# Patient Record
Sex: MALE | Race: WHITE | Employment: OTHER | URBAN - METROPOLITAN AREA
[De-identification: names, ages, dates, MRNs, and addresses within clinical notes are randomized per-mention and may not be internally consistent; named-entity substitution may affect disease eponyms.]

---

## 2020-05-18 ENCOUNTER — HOSPITAL ENCOUNTER (EMERGENCY)
Age: 85
Discharge: HOME | End: 2020-05-18
Attending: EMERGENCY MEDICINE
Payer: MEDICARE

## 2020-05-18 ENCOUNTER — APPOINTMENT (OUTPATIENT)
Dept: GENERAL RADIOLOGY | Age: 85
End: 2020-05-18
Payer: MEDICARE

## 2020-05-18 VITALS
RESPIRATION RATE: 18 BRPM | BODY MASS INDEX: 26.18 KG/M2 | OXYGEN SATURATION: 98 % | HEIGHT: 76 IN | DIASTOLIC BLOOD PRESSURE: 88 MMHG | HEART RATE: 65 BPM | WEIGHT: 215 LBS | SYSTOLIC BLOOD PRESSURE: 200 MMHG | TEMPERATURE: 97.3 F

## 2020-05-18 LAB
A/G RATIO: 1 (ref 1.1–2.2)
ALBUMIN SERPL-MCNC: 2.9 G/DL (ref 3.4–5)
ALP BLD-CCNC: 77 U/L (ref 40–129)
ALT SERPL-CCNC: 7 U/L (ref 10–40)
ANION GAP SERPL CALCULATED.3IONS-SCNC: 9 MMOL/L (ref 3–16)
AST SERPL-CCNC: 15 U/L (ref 15–37)
BASOPHILS ABSOLUTE: 0.1 K/UL (ref 0–0.2)
BASOPHILS RELATIVE PERCENT: 0.9 %
BILIRUB SERPL-MCNC: 0.3 MG/DL (ref 0–1)
BUN BLDV-MCNC: 49 MG/DL (ref 7–20)
CALCIUM SERPL-MCNC: 8.6 MG/DL (ref 8.3–10.6)
CHLORIDE BLD-SCNC: 109 MMOL/L (ref 99–110)
CO2: 24 MMOL/L (ref 21–32)
CREAT SERPL-MCNC: 3.5 MG/DL (ref 0.8–1.3)
EKG ATRIAL RATE: 70 BPM
EKG DIAGNOSIS: NORMAL
EKG P AXIS: 7 DEGREES
EKG P-R INTERVAL: 204 MS
EKG Q-T INTERVAL: 430 MS
EKG QRS DURATION: 96 MS
EKG QTC CALCULATION (BAZETT): 464 MS
EKG R AXIS: -21 DEGREES
EKG T AXIS: 129 DEGREES
EKG VENTRICULAR RATE: 70 BPM
EOSINOPHILS ABSOLUTE: 0.1 K/UL (ref 0–0.6)
EOSINOPHILS RELATIVE PERCENT: 1 %
GFR AFRICAN AMERICAN: 20
GFR NON-AFRICAN AMERICAN: 16
GLOBULIN: 2.9 G/DL
GLUCOSE BLD-MCNC: 133 MG/DL (ref 70–99)
GLUCOSE BLD-MCNC: 166 MG/DL (ref 70–99)
GLUCOSE BLD-MCNC: 175 MG/DL (ref 70–99)
HCT VFR BLD CALC: 36.5 % (ref 40.5–52.5)
HEMOGLOBIN: 12.1 G/DL (ref 13.5–17.5)
LYMPHOCYTES ABSOLUTE: 2.2 K/UL (ref 1–5.1)
LYMPHOCYTES RELATIVE PERCENT: 25.7 %
MCH RBC QN AUTO: 27 PG (ref 26–34)
MCHC RBC AUTO-ENTMCNC: 33.2 G/DL (ref 31–36)
MCV RBC AUTO: 81.3 FL (ref 80–100)
MONOCYTES ABSOLUTE: 1 K/UL (ref 0–1.3)
MONOCYTES RELATIVE PERCENT: 11.4 %
NEUTROPHILS ABSOLUTE: 5.2 K/UL (ref 1.7–7.7)
NEUTROPHILS RELATIVE PERCENT: 61 %
PDW BLD-RTO: 18.8 % (ref 12.4–15.4)
PERFORMED ON: ABNORMAL
PERFORMED ON: ABNORMAL
PLATELET # BLD: 143 K/UL (ref 135–450)
PMV BLD AUTO: 9.4 FL (ref 5–10.5)
POTASSIUM SERPL-SCNC: 4.1 MMOL/L (ref 3.5–5.1)
RBC # BLD: 4.49 M/UL (ref 4.2–5.9)
SODIUM BLD-SCNC: 142 MMOL/L (ref 136–145)
TOTAL PROTEIN: 5.8 G/DL (ref 6.4–8.2)
TROPONIN: 0.04 NG/ML
TROPONIN: 0.04 NG/ML
WBC # BLD: 8.5 K/UL (ref 4–11)

## 2020-05-18 PROCEDURE — 99285 EMERGENCY DEPT VISIT HI MDM: CPT

## 2020-05-18 PROCEDURE — 71045 X-RAY EXAM CHEST 1 VIEW: CPT

## 2020-05-18 PROCEDURE — 84484 ASSAY OF TROPONIN QUANT: CPT

## 2020-05-18 PROCEDURE — 93010 ELECTROCARDIOGRAM REPORT: CPT | Performed by: INTERNAL MEDICINE

## 2020-05-18 PROCEDURE — 80053 COMPREHEN METABOLIC PANEL: CPT

## 2020-05-18 PROCEDURE — 85025 COMPLETE CBC W/AUTO DIFF WBC: CPT

## 2020-05-18 PROCEDURE — 6370000000 HC RX 637 (ALT 250 FOR IP): Performed by: EMERGENCY MEDICINE

## 2020-05-18 PROCEDURE — 93005 ELECTROCARDIOGRAM TRACING: CPT | Performed by: EMERGENCY MEDICINE

## 2020-05-18 RX ORDER — LISINOPRIL 5 MG/1
5 TABLET ORAL ONCE
Status: COMPLETED | OUTPATIENT
Start: 2020-05-18 | End: 2020-05-18

## 2020-05-18 RX ADMIN — LISINOPRIL 5 MG: 5 TABLET ORAL at 12:28

## 2020-05-18 NOTE — ED NOTES
Bed: 04  Expected date: 5/18/20  Expected time: 9:18 AM  Means of arrival: [facility]  Comments:  [facility] [address] [facility], [facility]  05/18/20 0619

## 2020-05-18 NOTE — ED PROVIDER NOTES
OP)    Apply 1 drop to eye 2 times daily. Both eyes     GLYBURIDE (DIABETA) 5 MG TABLET    Take 10 mg by mouth 2 times daily. LISINOPRIL (PRINIVIL;ZESTRIL) 2.5 MG TABLET    Take 2.5 mg by mouth daily. LOVASTATIN (MEVACOR) 20 MG TABLET    Take 20 mg by mouth daily. ONDANSETRON (ZOFRAN) 4 MG TABLET    Take 1 tablet by mouth every 8 hours as needed for Nausea    PREDNISOLONE ACETATE (PRED FORTE) 1 % OPHTHALMIC SUSPENSION    Place 1 drop into the left eye 2 times daily. Social history:  reports that he quit smoking about 29 years ago. He does not have any smokeless tobacco history on file. He reports that he does not drink alcohol or use drugs. Family history:  History reviewed. No pertinent family history. Exam  ED Triage Vitals [05/18/20 0929]   BP Temp Temp Source Pulse Resp SpO2 Height Weight   (!) 202/99 97.5 °F (36.4 °C) Oral 71 17 99 % 6' 4\" (1.93 m) 215 lb (97.5 kg)     Nursing note and vitals reviewed. Constitutional: In no acute distress  HENT:      Head: Normocephalic      Ears: External ears normal.      Nose: Nose normal.     Mouth: Membrane mucosa moist   Eyes: No discharge. Neck: Supple. Trachea midline. Cardiovascular: Regular rate. Warm extremities  Pulmonary/Chest: Effort normal. No respiratory distress. Speaking in full sentences. No wheezes   Abdominal: Soft. No distension. Nontender  : Deferred  Rectal: Deferred   Musculoskeletal: Moves all extremities. No gross deformity. Neurological: Alert and oriented. Face symmetric. Speech is clear. Skin: Warm and dry. No rash. Psychiatric: Normal mood and affect.  Behavior is normal.    Procedures            Radiology  No orders to display       Labs  Results for orders placed or performed during the hospital encounter of 05/18/20   CBC Auto Differential   Result Value Ref Range    WBC 8.5 4.0 - 11.0 K/uL    RBC 4.49 4.20 - 5.90 M/uL    Hemoglobin 12.1 (L) 13.5 - 17.5 g/dL    Hematocrit 36.5 (L) 40.5 - 52.5 %    MCV 81.3 80.0 - 100.0 fL    MCH 27.0 26.0 - 34.0 pg    MCHC 33.2 31.0 - 36.0 g/dL    RDW 18.8 (H) 12.4 - 15.4 %    Platelets 358 272 - 212 K/uL    MPV 9.4 5.0 - 10.5 fL    Neutrophils % 61.0 %    Lymphocytes % 25.7 %    Monocytes % 11.4 %    Eosinophils % 1.0 %    Basophils % 0.9 %    Neutrophils Absolute 5.2 1.7 - 7.7 K/uL    Lymphocytes Absolute 2.2 1.0 - 5.1 K/uL    Monocytes Absolute 1.0 0.0 - 1.3 K/uL    Eosinophils Absolute 0.1 0.0 - 0.6 K/uL    Basophils Absolute 0.1 0.0 - 0.2 K/uL   POCT Glucose   Result Value Ref Range    POC Glucose 133 (H) 70 - 99 mg/dl    Performed on ACCU-CHEK        Screenings           MDM and ED Course  Patient is a 42-year-old man with past medical history of diabetes who presents with episode of hypoglycemia. Improved with glucose given en route by EMS. Now asymptomatic. Point-of-care glucose recheck here shows mild hyperglycemia now. Will give juice to the patient and observe him. (EMP MDM)    Last creatinine at [facility] 3.29, today 3.5. Troponin elevated 0.04 likely 2/2 ckd. Not having any chest pain or shortness of breath. Will repeat. Repeat stable. EKG reveals normal sinus rhythm with no ST changes. There is T wave flattening V5 and V6 however only comparison is from 8 years ago. CXR shows possible atelectasis vs pneumonia. Not having any infectious symptoms. No leukocytosis. Patient was reassessed. They are feeling well. Asymptomatic. Hypertensive, but missed meds. Objectively they appear to be in no acute distress. Discharge instructions, follow up instructions, and return precautions were discussed with the patient and any available family. All questions were answered. [unfilled]    Final Impression  1. Hypoglycemia        Blood pressure (!) 202/99, pulse 71, temperature 97.5 °F (36.4 °C), temperature source Oral, resp. rate 17, height 6' 4\" (1.93 m), weight 215 lb (97.5 kg), SpO2 99 %.      Disposition:  DISPOSITION        Patient Referrals:  No

## 2020-05-18 NOTE — ED NOTES
Pt given orange juice, pudding, and bee crackers. Pt is currently eating and drinking with no issues.       Simone Dakin, RN  05/18/20 6936

## 2020-05-18 NOTE — ED NOTES
Contact patient's son about a ride. He is bringing pants for the patient to put on. Son is about 15 minutes out.       Simone Dakin, RN  05/18/20 9032

## 2020-10-06 ENCOUNTER — APPOINTMENT (OUTPATIENT)
Dept: VASCULAR LAB | Age: 85
DRG: 812 | End: 2020-10-06
Payer: MEDICARE

## 2020-10-06 ENCOUNTER — HOSPITAL ENCOUNTER (INPATIENT)
Age: 85
LOS: 1 days | Discharge: HOME HEALTH | DRG: 812 | End: 2020-10-09
Attending: EMERGENCY MEDICINE | Admitting: INTERNAL MEDICINE
Payer: MEDICARE

## 2020-10-06 ENCOUNTER — APPOINTMENT (OUTPATIENT)
Dept: CT IMAGING | Age: 85
DRG: 812 | End: 2020-10-06
Payer: MEDICARE

## 2020-10-06 ENCOUNTER — APPOINTMENT (OUTPATIENT)
Dept: GENERAL RADIOLOGY | Age: 85
DRG: 812 | End: 2020-10-06
Payer: MEDICARE

## 2020-10-06 LAB
A/G RATIO: 1.1 (ref 1.1–2.2)
ALBUMIN SERPL-MCNC: 3.2 G/DL (ref 3.4–5)
ALP BLD-CCNC: 74 U/L (ref 40–129)
ALT SERPL-CCNC: 8 U/L (ref 10–40)
ANION GAP SERPL CALCULATED.3IONS-SCNC: 16 MMOL/L (ref 3–16)
AST SERPL-CCNC: 15 U/L (ref 15–37)
BACTERIA: ABNORMAL /HPF
BASOPHILS ABSOLUTE: 0.1 K/UL (ref 0–0.2)
BASOPHILS ABSOLUTE: 0.1 K/UL (ref 0–0.2)
BASOPHILS RELATIVE PERCENT: 0.6 %
BASOPHILS RELATIVE PERCENT: 1.4 %
BILIRUB SERPL-MCNC: 0.3 MG/DL (ref 0–1)
BILIRUBIN URINE: NEGATIVE
BLOOD, URINE: NEGATIVE
BUN BLDV-MCNC: 73 MG/DL (ref 7–20)
CALCIUM SERPL-MCNC: 8.6 MG/DL (ref 8.3–10.6)
CHLORIDE BLD-SCNC: 99 MMOL/L (ref 99–110)
CLARITY: CLEAR
CO2: 20 MMOL/L (ref 21–32)
COLOR: ABNORMAL
CREAT SERPL-MCNC: 3.6 MG/DL (ref 0.8–1.3)
EKG ATRIAL RATE: 65 BPM
EKG DIAGNOSIS: NORMAL
EKG P AXIS: 65 DEGREES
EKG P-R INTERVAL: 248 MS
EKG Q-T INTERVAL: 446 MS
EKG QRS DURATION: 98 MS
EKG QTC CALCULATION (BAZETT): 463 MS
EKG R AXIS: -47 DEGREES
EKG T AXIS: 76 DEGREES
EKG VENTRICULAR RATE: 65 BPM
EOSINOPHILS ABSOLUTE: 0.1 K/UL (ref 0–0.6)
EOSINOPHILS ABSOLUTE: 0.2 K/UL (ref 0–0.6)
EOSINOPHILS RELATIVE PERCENT: 0.7 %
EOSINOPHILS RELATIVE PERCENT: 1.6 %
GFR AFRICAN AMERICAN: 19
GFR NON-AFRICAN AMERICAN: 16
GLOBULIN: 3 G/DL
GLUCOSE BLD-MCNC: 176 MG/DL (ref 70–99)
GLUCOSE URINE: 100 MG/DL
HCT VFR BLD CALC: 28.3 % (ref 40.5–52.5)
HCT VFR BLD CALC: 29.4 % (ref 40.5–52.5)
HEMOGLOBIN: 9.5 G/DL (ref 13.5–17.5)
HEMOGLOBIN: 9.9 G/DL (ref 13.5–17.5)
KETONES, URINE: NEGATIVE MG/DL
LEUKOCYTE ESTERASE, URINE: ABNORMAL
LYMPHOCYTES ABSOLUTE: 2.2 K/UL (ref 1–5.1)
LYMPHOCYTES ABSOLUTE: 2.8 K/UL (ref 1–5.1)
LYMPHOCYTES RELATIVE PERCENT: 21.9 %
LYMPHOCYTES RELATIVE PERCENT: 23.9 %
MCH RBC QN AUTO: 29.1 PG (ref 26–34)
MCH RBC QN AUTO: 29.1 PG (ref 26–34)
MCHC RBC AUTO-ENTMCNC: 33.5 G/DL (ref 31–36)
MCHC RBC AUTO-ENTMCNC: 33.8 G/DL (ref 31–36)
MCV RBC AUTO: 86.2 FL (ref 80–100)
MCV RBC AUTO: 86.9 FL (ref 80–100)
MICROSCOPIC EXAMINATION: YES
MONOCYTES ABSOLUTE: 1 K/UL (ref 0–1.3)
MONOCYTES ABSOLUTE: 1.1 K/UL (ref 0–1.3)
MONOCYTES RELATIVE PERCENT: 9.1 %
MONOCYTES RELATIVE PERCENT: 9.4 %
NEUTROPHILS ABSOLUTE: 6.8 K/UL (ref 1.7–7.7)
NEUTROPHILS ABSOLUTE: 7.5 K/UL (ref 1.7–7.7)
NEUTROPHILS RELATIVE PERCENT: 64.8 %
NEUTROPHILS RELATIVE PERCENT: 66.6 %
NITRITE, URINE: NEGATIVE
PDW BLD-RTO: 15.5 % (ref 12.4–15.4)
PDW BLD-RTO: 15.8 % (ref 12.4–15.4)
PH UA: 6 (ref 5–8)
PLATELET # BLD: 138 K/UL (ref 135–450)
PLATELET # BLD: 180 K/UL (ref 135–450)
PMV BLD AUTO: 8.3 FL (ref 5–10.5)
PMV BLD AUTO: 8.7 FL (ref 5–10.5)
POTASSIUM SERPL-SCNC: 4.5 MMOL/L (ref 3.5–5.1)
PROTEIN UA: 100 MG/DL
RBC # BLD: 3.25 M/UL (ref 4.2–5.9)
RBC # BLD: 3.41 M/UL (ref 4.2–5.9)
RBC UA: ABNORMAL /HPF (ref 0–4)
SARS-COV-2, NAAT: NOT DETECTED
SODIUM BLD-SCNC: 135 MMOL/L (ref 136–145)
SPECIFIC GRAVITY UA: 1.02 (ref 1–1.03)
TOTAL PROTEIN: 6.2 G/DL (ref 6.4–8.2)
TROPONIN: 0.03 NG/ML
URINE TYPE: ABNORMAL
UROBILINOGEN, URINE: 0.2 E.U./DL
WBC # BLD: 10.3 K/UL (ref 4–11)
WBC # BLD: 11.6 K/UL (ref 4–11)
WBC UA: ABNORMAL /HPF (ref 0–5)

## 2020-10-06 PROCEDURE — 93005 ELECTROCARDIOGRAM TRACING: CPT | Performed by: NURSE PRACTITIONER

## 2020-10-06 PROCEDURE — G0378 HOSPITAL OBSERVATION PER HR: HCPCS

## 2020-10-06 PROCEDURE — 84484 ASSAY OF TROPONIN QUANT: CPT

## 2020-10-06 PROCEDURE — 93971 EXTREMITY STUDY: CPT

## 2020-10-06 PROCEDURE — 71045 X-RAY EXAM CHEST 1 VIEW: CPT

## 2020-10-06 PROCEDURE — 85025 COMPLETE CBC W/AUTO DIFF WBC: CPT

## 2020-10-06 PROCEDURE — 73502 X-RAY EXAM HIP UNI 2-3 VIEWS: CPT

## 2020-10-06 PROCEDURE — 72125 CT NECK SPINE W/O DYE: CPT

## 2020-10-06 PROCEDURE — U0002 COVID-19 LAB TEST NON-CDC: HCPCS

## 2020-10-06 PROCEDURE — 99285 EMERGENCY DEPT VISIT HI MDM: CPT

## 2020-10-06 PROCEDURE — 87077 CULTURE AEROBIC IDENTIFY: CPT

## 2020-10-06 PROCEDURE — 90715 TDAP VACCINE 7 YRS/> IM: CPT | Performed by: NURSE PRACTITIONER

## 2020-10-06 PROCEDURE — 73700 CT LOWER EXTREMITY W/O DYE: CPT

## 2020-10-06 PROCEDURE — 87186 SC STD MICRODIL/AGAR DIL: CPT

## 2020-10-06 PROCEDURE — 81001 URINALYSIS AUTO W/SCOPE: CPT

## 2020-10-06 PROCEDURE — 90471 IMMUNIZATION ADMIN: CPT | Performed by: NURSE PRACTITIONER

## 2020-10-06 PROCEDURE — 80053 COMPREHEN METABOLIC PANEL: CPT

## 2020-10-06 PROCEDURE — 2580000003 HC RX 258: Performed by: NURSE PRACTITIONER

## 2020-10-06 PROCEDURE — 96365 THER/PROPH/DIAG IV INF INIT: CPT

## 2020-10-06 PROCEDURE — 93005 ELECTROCARDIOGRAM TRACING: CPT | Performed by: EMERGENCY MEDICINE

## 2020-10-06 PROCEDURE — 87086 URINE CULTURE/COLONY COUNT: CPT

## 2020-10-06 PROCEDURE — 6360000002 HC RX W HCPCS: Performed by: NURSE PRACTITIONER

## 2020-10-06 PROCEDURE — 70450 CT HEAD/BRAIN W/O DYE: CPT

## 2020-10-06 PROCEDURE — 93010 ELECTROCARDIOGRAM REPORT: CPT | Performed by: INTERNAL MEDICINE

## 2020-10-06 RX ORDER — SODIUM CHLORIDE 9 MG/ML
INJECTION, SOLUTION INTRAVENOUS CONTINUOUS
Status: DISCONTINUED | OUTPATIENT
Start: 2020-10-07 | End: 2020-10-07

## 2020-10-06 RX ORDER — ATORVASTATIN CALCIUM 10 MG/1
10 TABLET, FILM COATED ORAL DAILY
Status: DISCONTINUED | OUTPATIENT
Start: 2020-10-07 | End: 2020-10-09 | Stop reason: HOSPADM

## 2020-10-06 RX ORDER — ACETAMINOPHEN 325 MG/1
650 TABLET ORAL EVERY 6 HOURS PRN
Status: DISCONTINUED | OUTPATIENT
Start: 2020-10-06 | End: 2020-10-09 | Stop reason: HOSPADM

## 2020-10-06 RX ORDER — ACETAMINOPHEN 650 MG/1
650 SUPPOSITORY RECTAL EVERY 6 HOURS PRN
Status: DISCONTINUED | OUTPATIENT
Start: 2020-10-06 | End: 2020-10-09 | Stop reason: HOSPADM

## 2020-10-06 RX ORDER — DEXTROSE MONOHYDRATE 50 MG/ML
100 INJECTION, SOLUTION INTRAVENOUS PRN
Status: DISCONTINUED | OUTPATIENT
Start: 2020-10-06 | End: 2020-10-09 | Stop reason: HOSPADM

## 2020-10-06 RX ORDER — POLYETHYLENE GLYCOL 3350 17 G/17G
17 POWDER, FOR SOLUTION ORAL DAILY PRN
Status: DISCONTINUED | OUTPATIENT
Start: 2020-10-06 | End: 2020-10-09 | Stop reason: HOSPADM

## 2020-10-06 RX ORDER — PROMETHAZINE HYDROCHLORIDE 25 MG/1
12.5 TABLET ORAL EVERY 6 HOURS PRN
Status: DISCONTINUED | OUTPATIENT
Start: 2020-10-06 | End: 2020-10-09 | Stop reason: HOSPADM

## 2020-10-06 RX ORDER — DEXTROSE MONOHYDRATE 25 G/50ML
12.5 INJECTION, SOLUTION INTRAVENOUS PRN
Status: DISCONTINUED | OUTPATIENT
Start: 2020-10-06 | End: 2020-10-09 | Stop reason: HOSPADM

## 2020-10-06 RX ORDER — ASPIRIN 81 MG/1
81 TABLET ORAL DAILY
Status: DISCONTINUED | OUTPATIENT
Start: 2020-10-07 | End: 2020-10-09 | Stop reason: HOSPADM

## 2020-10-06 RX ORDER — 0.9 % SODIUM CHLORIDE 0.9 %
1000 INTRAVENOUS SOLUTION INTRAVENOUS ONCE
Status: COMPLETED | OUTPATIENT
Start: 2020-10-06 | End: 2020-10-06

## 2020-10-06 RX ORDER — ONDANSETRON 2 MG/ML
4 INJECTION INTRAMUSCULAR; INTRAVENOUS EVERY 6 HOURS PRN
Status: DISCONTINUED | OUTPATIENT
Start: 2020-10-06 | End: 2020-10-09 | Stop reason: HOSPADM

## 2020-10-06 RX ORDER — SODIUM CHLORIDE 0.9 % (FLUSH) 0.9 %
10 SYRINGE (ML) INJECTION EVERY 12 HOURS SCHEDULED
Status: DISCONTINUED | OUTPATIENT
Start: 2020-10-07 | End: 2020-10-09 | Stop reason: HOSPADM

## 2020-10-06 RX ORDER — LISINOPRIL 2.5 MG/1
2.5 TABLET ORAL DAILY
Status: DISCONTINUED | OUTPATIENT
Start: 2020-10-07 | End: 2020-10-07

## 2020-10-06 RX ORDER — SODIUM CHLORIDE 0.9 % (FLUSH) 0.9 %
10 SYRINGE (ML) INJECTION PRN
Status: DISCONTINUED | OUTPATIENT
Start: 2020-10-06 | End: 2020-10-09 | Stop reason: HOSPADM

## 2020-10-06 RX ORDER — TRAMADOL HYDROCHLORIDE 50 MG/1
50 TABLET ORAL EVERY 6 HOURS PRN
Status: DISCONTINUED | OUTPATIENT
Start: 2020-10-06 | End: 2020-10-09 | Stop reason: HOSPADM

## 2020-10-06 RX ORDER — ATENOLOL 25 MG/1
25 TABLET ORAL DAILY
Status: DISCONTINUED | OUTPATIENT
Start: 2020-10-07 | End: 2020-10-09 | Stop reason: HOSPADM

## 2020-10-06 RX ORDER — NICOTINE POLACRILEX 4 MG
15 LOZENGE BUCCAL PRN
Status: DISCONTINUED | OUTPATIENT
Start: 2020-10-06 | End: 2020-10-09 | Stop reason: HOSPADM

## 2020-10-06 RX ADMIN — SODIUM CHLORIDE 1000 ML: 9 INJECTION, SOLUTION INTRAVENOUS at 20:07

## 2020-10-06 RX ADMIN — CEFTRIAXONE SODIUM 1 G: 1 INJECTION, POWDER, FOR SOLUTION INTRAMUSCULAR; INTRAVENOUS at 23:22

## 2020-10-06 RX ADMIN — TETANUS TOXOID, REDUCED DIPHTHERIA TOXOID AND ACELLULAR PERTUSSIS VACCINE, ADSORBED 0.5 ML: 5; 2.5; 8; 8; 2.5 SUSPENSION INTRAMUSCULAR at 20:07

## 2020-10-06 ASSESSMENT — PAIN SCALES - GENERAL: PAINLEVEL_OUTOF10: 10

## 2020-10-06 NOTE — ED NOTES
During orthostatic blood pressure test, pt became hypotensive and nearly had a syncopal episode during the standing trial. Stood pt with the assistance of a walker and was able to guide pt back into the bed without pt falling. Pt looked faint at this point before pt was guided back into the bed. For these reasons, my clinical judgment determined that it was unsafe to walk this pt and the risk of this pt falling is high. Pt's son also stated that \"the pt's doctor changed his medication roughly a month ago\". Relayed all this information to Rafal Mcnair  10/06/20 1953

## 2020-10-06 NOTE — ED PROVIDER NOTES
temperature source Oral, resp. rate 18, weight 174 lb (78.9 kg), SpO2 100 %. For further details of [facility] emergency department encounter, please see documentation by advanced practice provider, Bill Dial.        Neville Adan MD  10/06/20 8322

## 2020-10-06 NOTE — ED NOTES
Bed: 30  Expected date:   Expected time:   Means of arrival:   Comments:  Lam Carrillo, RN  10/06/20 1500

## 2020-10-06 NOTE — ED PROVIDER NOTES
[facility] Emergency Department    CHIEF COMPLAINT  Hip Pain (left hip pain  pulling jeans on) and Loss of Consciousness ( injured left elbow)      HISTORY OF PRESENT ILLNESS  Keeley Clay is a 80 y.o. male who presents to the ED complaining of left hip pain. Patient reports that he was putting on his jeans and suddenly experienced severe left hip pain. Patient's family reports he then \"passed out. \"  Syncopal episode was witnessed. No reported head injury. Abrasion to left forearm from fall. No other reported injury or trauma. Patient is in emergency department stretcher and reports he has no left hip pain if he has at rest, but if he tries to move the left lower extremity or get up he has severe pain. Patient denies history of hip fracture or hip replacement. Patient denies numbness or tingling of the left lower extremity. Patient unsure of last tetanus vaccination. Patient denies recent illness, cough, emesis, diarrhea, urinary complaints. Patient does self catheterize himself 3 times a day. No other complaints, modifying factors or associated symptoms. Nursing notes reviewed. Past Medical History:   Diagnosis Date    Aortic aneurysm ([address]     aortic stent in place    Arthritis     Cancer ([address]     skin    Diabetes mellitus ([address]     Hyperlipidemia     Hypertension     Urinary problem     self cath tid    Wears glasses      Past Surgical History:   Procedure Laterality Date    CATARACT REMOVAL WITH IMPLANT  3/2012    LUNG BIOPSY      negative    OTHER SURGICAL HISTORY      aortic stent for aneurysm    TURP       History reviewed. No pertinent family history. Social History     Socioeconomic History    Marital status:       Spouse name: Not on file    Number of children: Not on file    Years of education: Not on file    Highest education level: Not on file   Occupational History    Not on file   Social Needs    Financial resource strain: Not on file    Food insecurity     Worry: Not on file     Inability: Not on file    Transportation needs     Medical: Not on file     Non-medical: Not on file   Tobacco Use    Smoking status: Former Smoker     Last attempt to quit: 3/28/1991     Years since quittin.5    Smokeless tobacco: Never Used   Substance and Sexual Activity    Alcohol use: No    Drug use: No    Sexual activity: Not on file   Lifestyle    Physical activity     Days per week: Not on file     Minutes per session: Not on file    Stress: Not on file   Relationships    Social connections     Talks on phone: Not on file     Gets together: Not on file     Attends [employer] service: Not on file     Active member of club or organization: Not on file     Attends meetings of clubs or organizations: Not on file     Relationship status: Not on file    Intimate partner violence     Fear of current or ex partner: Not on file     Emotionally abused: Not on file     Physically abused: Not on file     Forced sexual activity: Not on file   Other Topics Concern    Not on file   Social History Narrative    Not on file     Current Facility-Administered Medications   Medication Dose Route Frequency Provider Last Rate Last Dose    Tetanus-Diphth-Acell Pertussis (BOOSTRIX) injection 0.5 mL  0.5 mL Intramuscular Once BRIAN Marquez - CNP         Current Outpatient Medications   Medication Sig Dispense Refill    ondansetron (ZOFRAN) 4 MG tablet Take 1 tablet by mouth every 8 hours as needed for Nausea 10 tablet 0    atenolol (TENORMIN) 25 MG tablet Take 25 mg by mouth daily      glyBURIDE (DIABETA) 5 MG tablet Take 10 mg by mouth 2 times daily.  lisinopril (PRINIVIL;ZESTRIL) 2.5 MG tablet Take 2.5 mg by mouth daily.  lovastatin (MEVACOR) 20 MG tablet Take 20 mg by mouth daily.  Dorzolamide HCl-Timolol Mal (COSOPT OP) Apply 1 drop to eye 2 times daily.  Both eyes       prednisoLONE acetate (PRED FORTE) 1 % ophthalmic suspension Place 1 drop into the left eye 2 times daily.  aspirin 81 MG EC tablet Take 81 mg by mouth daily.  atropine 1 % ophthalmic solution Place 1 drop into the left eye 2 times daily. No Known Allergies    REVIEW OF SYSTEMS  10 systems reviewed, pertinent positives per HPI otherwise noted to be negative    PHYSICAL EXAM  /63   Pulse 69   Temp 97.6 °F (36.4 °C) (Oral)   Resp 18   Wt 174 lb (78.9 kg)   SpO2 100%   BMI 21.18 kg/m²   GENERAL APPEARANCE: Awake and alert. Cooperative. No acute distress. Vital signs are stable. Well appearing and non toxic. HEAD: Normocephalic. Atraumatic. No hematoma or laceration. EYES: PERRL. EOM's grossly intact. ENT: Mucous membranes are moist.   NECK: Supple. Normal ROM. No cervical spine tenderness palpation. HEART: RRR. Distal pulses are equal and intact. Cap refill less than 2 seconds. LUNGS: Respirations unlabored. CTAB. Good air exchange. Speaking comfortably in full sentences. No wheezing, rhonchi, rales, stridor. ABDOMEN: Soft. Non-distended. Non-tender. No guarding or rebound. No rigidity. EXTREMITIES: Pitting edema to the left lower extremity larger than the right lower extremity. No pitting edema to the right lower extremity. Patient unable to lift the left lower extremity due to pain in the lateral left hip. No obvious deformity. No external or internal rotation. No ecchymosis, edema, erythema. Left pedal pulse +2 and equal to the right. All extremities neurovascularly intact. SKIN: Warm and dry. No acute rashes. Superficial abrasion to the left ventral surface of the forearm. No obvious deformity. No bony tenderness. NEUROLOGICAL: Alert and oriented. No gross facial drooping. Strength 5/5, sensation intact. PSYCHIATRIC: Normal mood and affect.     SCREENINGS       RADIOLOGY    Ct Head Wo Contrast    Result Date: 10/6/2020  EXAMINATION: CT OF THE HEAD WITHOUT CONTRAST  10/6/2020 3:24 pm TECHNIQUE: CT of the head was performed without the administration of intravenous contrast. Dose modulation, iterative reconstruction, and/or weight based adjustment of the mA/kV was utilized to reduce the radiation dose to as low as reasonably achievable. COMPARISON: None. HISTORY: ORDERING SYSTEM PROVIDED HISTORY: syncope TECHNOLOGIST PROVIDED HISTORY: Reason for exam:->syncope Has a \"code stroke\" or \"stroke alert\" been called? ->No Reason for Exam: syncope, fall Acuity: Acute Type of Exam: Initial FINDINGS: BRAIN/VENTRICLES: The ventricles and sulci are prominent. Pattern is consistent with age-related atrophy. No extra-axial fluid collections, and no sign of recent intracranial hemorrhage. Decreased attenuation is noted within the periventricular white matter. Pattern is consistent with chronic small vessel ischemic change. No acute edema or mass effect. No mass lesions are detected. ORBITS: The visualized portion of the orbits demonstrate no acute abnormality. SINUSES: The visualized paranasal sinuses and mastoid air cells demonstrate no acute abnormality. SOFT TISSUES/SKULL:  No acute abnormality of the visualized skull or soft tissues. No acute intracranial abnormality. Ct Cervical Spine Wo Contrast    Result Date: 10/6/2020  EXAMINATION: CT OF THE CERVICAL SPINE WITHOUT CONTRAST 10/6/2020 3:24 pm TECHNIQUE: CT of the cervical spine was performed without the administration of intravenous contrast. Multiplanar reformatted images are provided for review. Dose modulation, iterative reconstruction, and/or weight based adjustment of the mA/kV was utilized to reduce the radiation dose to as low as reasonably achievable. COMPARISON: None. HISTORY: ORDERING SYSTEM PROVIDED HISTORY: fall TECHNOLOGIST PROVIDED HISTORY: If patient is on cardiac monitor and/or pulse ox, they may be taken off cardiac monitor and pulse ox, left on O2 if currently on. All monitors reattached when patient returns to room.  Reason for exam:->fall Reason for Exam: syncope, fall Acuity: Acute Type of Exam: Initial FINDINGS: BONES/ALIGNMENT: There is no acute fracture or traumatic malalignment. DEGENERATIVE CHANGES: Mild disc space narrowing throughout the cervical spine. SOFT TISSUES: There is no prevertebral soft tissue swelling. No evidence for fracture or malalignment of the cervical spine. Vl Extremity Venous Left    Result Date: 10/6/2020  Lower Extremities DVT Study  Demographics   Patient Name       Courtney Isaacs   Date of Study      10/06/2020        Gender              Male   Patient Number     [id number]        Date of Birth       10/17/1927   Visit Number       [id number]         Age                 80 year(s)   Accession Number   [id number]         Room Number         27   Corporate ID       [id number]          Sonographer         Rosalina London RDMS, T   Ordering Physician Aimee Quinn, [address] [place] [address] Vascular                                       Physician           Readers                                                           Abdi Dunlap MD  Procedure Type of Study:   Veins:Lower Extremities DVT Study, VL EXTREMITY VENOUS DUPLEX LEFT. Vascular Sonographer Report  Indications for Study:Swelling. Additional Indications:Left leg swelling for approximately one month. Venous Duplex Scan: B-mode imaging of the deep and superficial veins, with compression maneuvers, including color and Doppler spectral waveform analysis. Impressions Right Impression There is no evidence of deep venous thrombosis involving the common femoral vein. Left Impression There is no evidence of deep or superficial venous thrombosis involving the left lower extremity.  Incidental finding of a complex cystic structure at the medial joint space measuring 4.4 X 4.1 cm consistent with a Baker's cyst. There is no previous exam for comparison. Conclusions   Summary   No evidence of deep vein or superficial thrombosis involving the left lower  extremity and the contralateral proximal common femoral vein. Incidental finding of a complex cystic structure at the medial joint space  measuring 4.4 X 4.1 cm consistent with a Baker's cyst.   Signature   ------------------------------------------------------------------  Electronically signed by Mayur Stephenson MD (Interpreting  physician) on 10/06/2020 at 04:36 PM  ------------------------------------------------------------------  Patient Status:STAT. Study [facility] - Vascular Lab. Technical Quality:Adequate visualization. Velocities are measured in cm/s ; Diameters are measured in mm Right Lower Extremities DVT Study Measurements Right 2D Measurements +--------------+----------+---------------+----------+ ! Location      ! Visualized! Compressibility! Thrombosis! +--------------+----------+---------------+----------+ ! Common Femoral!Yes       ! Yes            ! None      ! +--------------+----------+---------------+----------+ Right Doppler Measurements +--------------+------+------+------------+ ! Location      ! Signal!Reflux! Reflux (sec)! +--------------+------+------+------------+ ! Common Femoral!Phasic! No    !            ! +--------------+------+------+------------+ Left Lower Extremities DVT Study Measurements Left 2D Measurements +------------------------+----------+---------------+----------+ ! Location                ! Visualized! Compressibility! Thrombosis! +------------------------+----------+---------------+----------+ ! Sapheno Femoral Junction! Yes       ! Yes            ! None      ! +------------------------+----------+---------------+----------+ ! GSV Thigh               ! Yes       ! Yes            ! None      ! +------------------------+----------+---------------+----------+ ! Common Femoral          !Yes       ! Yes            ! None      ! +------------------------+----------+---------------+----------+ ! Femoral                 !Yes       ! Yes            ! None      ! +------------------------+----------+---------------+----------+ ! Prox Femoral            !Yes       ! Yes            ! None      ! +------------------------+----------+---------------+----------+ ! Mid Femoral             !Yes       ! Yes            ! None      ! +------------------------+----------+---------------+----------+ ! Dist Femoral            !Yes       ! Yes            ! None      ! +------------------------+----------+---------------+----------+ ! Deep Femoral            !Yes       ! Yes            ! None      ! +------------------------+----------+---------------+----------+ ! Popliteal               !Yes       ! Yes            ! None      ! +------------------------+----------+---------------+----------+ ! GSV Below Knee          ! Yes       ! Yes            ! None      ! +------------------------+----------+---------------+----------+ ! Gastroc                 ! Yes       ! Yes            ! None      ! +------------------------+----------+---------------+----------+ ! Soleal                  !Yes       ! Yes            ! None      ! +------------------------+----------+---------------+----------+ ! PTV                     ! Yes       ! Yes            ! None      ! +------------------------+----------+---------------+----------+ ! Peroneal                !Yes       ! Yes            ! None      ! +------------------------+----------+---------------+----------+ ! GSV Calf                ! Yes       ! Yes            ! None      ! +------------------------+----------+---------------+----------+ ! SSV                     ! Yes       ! Yes            ! None      ! +------------------------+----------+---------------+----------+ Left Doppler Measurements +------------------------+------+------+------------+ ! Location                ! Signal!Reflux! Reflux (sec)!  +------------------------+------+------+------------+ !Sapheno Femoral Junction! Phasic! No    !            ! +------------------------+------+------+------------+ ! Common Femoral          !Phasic! No    !            ! +------------------------+------+------+------------+ ! Femoral                 !Phasic! No    !            ! +------------------------+------+------+------------+ ! Deep Femoral            !Phasic! No    !            ! +------------------------+------+------+------------+ ! Popliteal               !Phasic! No    !            ! +------------------------+------+------+------------+    Xr Chest Portable    Result Date: 10/6/2020  EXAMINATION: ONE XRAY VIEW OF THE CHEST 10/6/2020 3:08 pm COMPARISON: Chest x-ray dated 05/18/2020 HISTORY: ORDERING SYSTEM PROVIDED HISTORY: chest pain TECHNOLOGIST PROVIDED HISTORY: Reason for exam:->chest pain Reason for Exam: Hip Pain (left hip pain pulling jeans on) FINDINGS: HEART/MEDIASTINUM: The cardiomediastinal silhouette is within normal limits. PLEURA/LUNGS: There are no focal consolidations or pleural effusions. There is no appreciable pneumothorax. Scarring is again noted in the left mid lung. BONES/SOFT TISSUE: No acute abnormality. No radiographic evidence of acute pulmonary disease. Ct Hip Left Wo Contrast    Result Date: 10/6/2020  EXAMINATION: CT OF THE LEFT HIP WITHOUT CONTRAST 10/6/2020 4:36 pm TECHNIQUE: CT of the left hip was performed without the administration of intravenous contrast.  Multiplanar reformatted images are provided for review. Dose modulation, iterative reconstruction, and/or weight based adjustment of the mA/kV was utilized to reduce the radiation dose to as low as reasonably achievable. COMPARISON: Left hip radiographs 10/06/2020. HISTORY ORDERING SYSTEM PROVIDED HISTORY: pain, xr is neg TECHNOLOGIST PROVIDED HISTORY: Reason for exam:->pain, xr is neg Reason for Exam: fall, pain, xr is neg Acuity: Acute Type of Exam: Initial FINDINGS: Bones: No fracture or dislocation.   No suspicious lytic or blastic osseous lesion. Soft Tissue: The urinary bladder is unremarkable. The prostate gland is within normal limits. Right inguinal hernia containing loops of nonobstructed small bowel. Larger left inguinal hernia containing a large portion of the sigmoid colon. Colonic diverticulosis. No free fluid in the pelvis. No pelvic or inguinal lymphadenopathy. Asymmetrically increased size of the left psoas and iliacus muscles mild hyperattenuation. Joint:  Degenerative changes of the visualized lower lumbar spine. Mild degenerative changes of the bilateral sacroiliac joints and pubic symphysis. Moderate degenerative changes of the bilateral hips. 1. No acute osseous abnormality. 2. Increased size and attenuation of the left psoas and iliacus muscles compatible with intramuscular hemorrhage. 3. Bilateral inguinal hernias containing loops of nonobstructed bowel. Xr Hip 2-3 Vw W Pelvis Left    Result Date: 10/6/2020  EXAMINATION: ONE XRAY VIEW OF THE PELVIS AND TWO XRAY VIEWS LEFT HIP 10/6/2020 3:12 pm COMPARISON: None. HISTORY: ORDERING SYSTEM PROVIDED HISTORY: fall TECHNOLOGIST PROVIDED HISTORY: Reason for exam:->fall FINDINGS: No acute fracture dislocation. Mild narrowing of the left hip joint. Marginal osteophytes at the left hip joint. Small subchondral cyst superolateral acetabulum. No acute fracture         CONSULTS  IP CONSULT TO HOSPITALIST  IP CONSULT TO ORTHOPEDIC SURGERY  IP CONSULT TO NEPHROLOGY  IP CONSULT TO VASCULAR SURGERY    ED COURSE/MDM  Patient seen and evaluated. Old records reviewed. Diagnostic testing reviewed and results discussed. I have seen this patient in collaboration with supervising physician Dr. Alise Amato. We thoroughly discussed the history, physical exam, diagnostic testing and emergency department course. Eun Campos presented to the ED today with above noted complaints. Patient declined pain medication. Left forearm abrasion wound care performed.   Tetanus vaccination updated in the emergency department. [address] emergency department work-up notable for intramuscular hemorrhage of the left psoas and iliacus muscles. Venous Doppler of the left lower extremity negative for DVT. CT of the head shows no acute intracranial hemorrhage. No acute findings of cervical spine. EKG interpreted by my attending physician. Remainder of her emergency department course urinalysis is notable for trace leukocytes and  WBCs 1+ bacteria. Urine culture pending. CBC does show a mild drop in hemoglobin and hematocrit from previous results. Hemoglobin today is 9.9 and 29.4. No leukocytosis or bandemia. This was repeated during emergency department course given intramuscular hemorrhage in the left psoas muscles. Slight drop to 9.5 and 28.3. This is most likely due to the fluid administration. CMP unremarkable chronic kidney disease unchanged GFR 16 creatinine 3.6 BUN 73. No electrolyte abnormality. Troponin 0 0.03 this appears to be patient's baseline most likely due to chronic kidney disease. Patient declined pain medication. Patient extremely symptomatic when changing positions from lying to standing. Patient almost had a syncopal episode per tachycardia performed orthostatic vital signs. Blood pressure went from 174/73 lying to 94/50 standing. Son also later reported that patient just recently had a change in his blood pressure medications. Given the CT findings of the intramuscular hemorrhage of the left psoas muscles, the orthostatic hypotension and patient's symptomatic dizziness we did feel patient warranted hospital admission for further observation and management. While in ED patient received Rocephin and tetanus vaccination. Sodium chloride bolus. A discussion was had with the patient and/or their surrogate regarding diagnosis, diagnostic testing results, treatment/ plan of care.  There was shared decision-making between 1.020 1.005 - 1.030    Blood, Urine Negative Negative    pH, UA 6.0 5.0 - 8.0    Protein,  (A) Negative mg/dL    Urobilinogen, Urine 0.2 <2.0 E.U./dL    Nitrite, Urine Negative Negative    Leukocyte Esterase, Urine TRACE (A) Negative    Microscopic Examination YES     Urine Type NotGiven    Troponin   Result Value Ref Range    Troponin 0.03 (H) <0.01 ng/mL   CBC auto differential   Result Value Ref Range    WBC 10.3 4.0 - 11.0 K/uL    RBC 3.25 (L) 4.20 - 5.90 M/uL    Hemoglobin 9.5 (L) 13.5 - 17.5 g/dL    Hematocrit 28.3 (L) 40.5 - 52.5 %    MCV 86.9 80.0 - 100.0 fL    MCH 29.1 26.0 - 34.0 pg    MCHC 33.5 31.0 - 36.0 g/dL    RDW 15.5 (H) 12.4 - 15.4 %    Platelets 443 046 - 660 K/uL    MPV 8.3 5.0 - 10.5 fL    Neutrophils % 66.6 %    Lymphocytes % 21.9 %    Monocytes % 9.4 %    Eosinophils % 0.7 %    Basophils % 1.4 %    Neutrophils Absolute 6.8 1.7 - 7.7 K/uL    Lymphocytes Absolute 2.2 1.0 - 5.1 K/uL    Monocytes Absolute 1.0 0.0 - 1.3 K/uL    Eosinophils Absolute 0.1 0.0 - 0.6 K/uL    Basophils Absolute 0.1 0.0 - 0.2 K/uL   Microscopic Urinalysis   Result Value Ref Range    WBC, UA  (A) 0 - 5 /HPF    RBC, UA 0-2 0 - 4 /HPF    Bacteria, UA 1+ (A) None Seen /HPF   COVID-19   Result Value Ref Range    SARS-CoV-2, NAAT Not Detected Not Detected   Troponin   Result Value Ref Range    Troponin 0.03 (H) <0.01 ng/mL   POCT Glucose   Result Value Ref Range    POC Glucose 158 (H) 70 - 99 mg/dl    Performed on [id number]    EKG 12 Lead   Result Value Ref Range    Ventricular Rate 65 BPM    Atrial Rate 65 BPM    P-R Interval 248 ms    QRS Duration 98 ms    Q-T Interval 446 ms    QTc Calculation (Bazett) 463 ms    P Axis 65 degrees    R Axis -47 degrees    T Axis 76 degrees    Diagnosis       Sinus rhythm with 1st degree A-V blockAbnormal P wavesLeft anterior fascicular blockAbnormal ECGConfirmed by Camron Fuchs MD, Khushi Gunn ([id number]) on 10/6/2020 4:48:41 PM       I spoke with Miki Walsh CNP.  We thoroughly discussed the history, physical exam, laboratory and imaging studies, as well as, emergency department course. Based upon that discussion, we've decided to admit Ronn Brody for further observation and evaluation of Jamir Hernandez's syncope and collapse orthostatic hypotension. As I have deemed necessary from their history, physical, and studies, I have considered and evaluated Ronn Brody for the following diagnoses: Intracranial hemorrhage, hip fracture, sepsis, anemia      FINAL IMPRESSION  1. Syncope and collapse    2. Orthostatic hypotension    3. Intramuscular hematoma        Vitals:  Blood pressure (!) 159/82, pulse 80, temperature 98.3 °F (36.8 °C), temperature source Oral, resp. rate 19, height 6' 4\" (1.93 m), weight 174 lb (78.9 kg), SpO2 100 %. DISPOSITION  Patient was admitted to the hospital in stable condition. Comment: Please note this report has been produced using speech recognition software and may contain errors related to that system including errors in grammar, punctuation, and spelling, as well as words and phrases that may be inappropriate. If there are any questions or concerns please feel free to contact the dictating provider for clarification.             [id number] Leela To, APRN - CNP  10/07/20 0147

## 2020-10-07 PROBLEM — S70.12XA ILIOPSOAS MUSCLE HEMATOMA, LEFT, INITIAL ENCOUNTER: Status: ACTIVE | Noted: 2020-10-07

## 2020-10-07 PROBLEM — D62 ANEMIA DUE TO BLOOD LOSS, ACUTE: Status: ACTIVE | Noted: 2020-10-07

## 2020-10-07 LAB
ANION GAP SERPL CALCULATED.3IONS-SCNC: 11 MMOL/L (ref 3–16)
BASOPHILS ABSOLUTE: 0.1 K/UL (ref 0–0.2)
BASOPHILS RELATIVE PERCENT: 1 %
BUN BLDV-MCNC: 70 MG/DL (ref 7–20)
CALCIUM SERPL-MCNC: 8.3 MG/DL (ref 8.3–10.6)
CHLORIDE BLD-SCNC: 103 MMOL/L (ref 99–110)
CO2: 22 MMOL/L (ref 21–32)
CREAT SERPL-MCNC: 3.5 MG/DL (ref 0.8–1.3)
EOSINOPHILS ABSOLUTE: 0.2 K/UL (ref 0–0.6)
EOSINOPHILS RELATIVE PERCENT: 2.2 %
ESTIMATED AVERAGE GLUCOSE: 139.9 MG/DL
GFR AFRICAN AMERICAN: 20
GFR NON-AFRICAN AMERICAN: 16
GLUCOSE BLD-MCNC: 139 MG/DL (ref 70–99)
GLUCOSE BLD-MCNC: 158 MG/DL (ref 70–99)
GLUCOSE BLD-MCNC: 166 MG/DL (ref 70–99)
GLUCOSE BLD-MCNC: 173 MG/DL (ref 70–99)
GLUCOSE BLD-MCNC: 198 MG/DL (ref 70–99)
GLUCOSE BLD-MCNC: 206 MG/DL (ref 70–99)
GLUCOSE BLD-MCNC: 213 MG/DL (ref 70–99)
HBA1C MFR BLD: 6.5 %
HCT VFR BLD CALC: 23 % (ref 40.5–52.5)
HCT VFR BLD CALC: 23.9 % (ref 40.5–52.5)
HCT VFR BLD CALC: 24.4 % (ref 40.5–52.5)
HEMOGLOBIN: 7.7 G/DL (ref 13.5–17.5)
HEMOGLOBIN: 7.9 G/DL (ref 13.5–17.5)
HEMOGLOBIN: 8.3 G/DL (ref 13.5–17.5)
LYMPHOCYTES ABSOLUTE: 2 K/UL (ref 1–5.1)
LYMPHOCYTES RELATIVE PERCENT: 26.6 %
MCH RBC QN AUTO: 29 PG (ref 26–34)
MCHC RBC AUTO-ENTMCNC: 33.2 G/DL (ref 31–36)
MCV RBC AUTO: 87.5 FL (ref 80–100)
MONOCYTES ABSOLUTE: 1 K/UL (ref 0–1.3)
MONOCYTES RELATIVE PERCENT: 12.6 %
NEUTROPHILS ABSOLUTE: 4.4 K/UL (ref 1.7–7.7)
NEUTROPHILS RELATIVE PERCENT: 57.6 %
PDW BLD-RTO: 15.7 % (ref 12.4–15.4)
PERFORMED ON: ABNORMAL
PLATELET # BLD: 130 K/UL (ref 135–450)
PMV BLD AUTO: 8.1 FL (ref 5–10.5)
POTASSIUM REFLEX MAGNESIUM: 4.2 MMOL/L (ref 3.5–5.1)
RBC # BLD: 2.73 M/UL (ref 4.2–5.9)
SODIUM BLD-SCNC: 136 MMOL/L (ref 136–145)
TROPONIN: 0.03 NG/ML
TROPONIN: 0.03 NG/ML
WBC # BLD: 7.7 K/UL (ref 4–11)

## 2020-10-07 PROCEDURE — 6360000002 HC RX W HCPCS: Performed by: NURSE PRACTITIONER

## 2020-10-07 PROCEDURE — 96366 THER/PROPH/DIAG IV INF ADDON: CPT

## 2020-10-07 PROCEDURE — 36415 COLL VENOUS BLD VENIPUNCTURE: CPT

## 2020-10-07 PROCEDURE — 6370000000 HC RX 637 (ALT 250 FOR IP): Performed by: NURSE PRACTITIONER

## 2020-10-07 PROCEDURE — 6370000000 HC RX 637 (ALT 250 FOR IP): Performed by: INTERNAL MEDICINE

## 2020-10-07 PROCEDURE — 97162 PT EVAL MOD COMPLEX 30 MIN: CPT

## 2020-10-07 PROCEDURE — G0378 HOSPITAL OBSERVATION PER HR: HCPCS

## 2020-10-07 PROCEDURE — APPNB30 APP NON BILLABLE TIME 0-30 MINS: Performed by: PHYSICIAN ASSISTANT

## 2020-10-07 PROCEDURE — 83036 HEMOGLOBIN GLYCOSYLATED A1C: CPT

## 2020-10-07 PROCEDURE — 85014 HEMATOCRIT: CPT

## 2020-10-07 PROCEDURE — 97110 THERAPEUTIC EXERCISES: CPT

## 2020-10-07 PROCEDURE — 84484 ASSAY OF TROPONIN QUANT: CPT

## 2020-10-07 PROCEDURE — 97530 THERAPEUTIC ACTIVITIES: CPT

## 2020-10-07 PROCEDURE — 97116 GAIT TRAINING THERAPY: CPT

## 2020-10-07 PROCEDURE — 2580000003 HC RX 258: Performed by: NURSE PRACTITIONER

## 2020-10-07 PROCEDURE — 85025 COMPLETE CBC W/AUTO DIFF WBC: CPT

## 2020-10-07 PROCEDURE — 97165 OT EVAL LOW COMPLEX 30 MIN: CPT

## 2020-10-07 PROCEDURE — 80048 BASIC METABOLIC PNL TOTAL CA: CPT

## 2020-10-07 PROCEDURE — 85018 HEMOGLOBIN: CPT

## 2020-10-07 PROCEDURE — 51701 INSERT BLADDER CATHETER: CPT

## 2020-10-07 PROCEDURE — 2580000003 HC RX 258: Performed by: INTERNAL MEDICINE

## 2020-10-07 RX ORDER — HYDRALAZINE HYDROCHLORIDE 25 MG/1
25 TABLET, FILM COATED ORAL 2 TIMES DAILY
Status: ON HOLD | COMMUNITY
Start: 2020-09-09 | End: 2020-10-09 | Stop reason: HOSPADM

## 2020-10-07 RX ORDER — FUROSEMIDE 40 MG/1
40 TABLET ORAL DAILY
Status: DISCONTINUED | OUTPATIENT
Start: 2020-10-08 | End: 2020-10-07

## 2020-10-07 RX ORDER — SODIUM CHLORIDE 9 MG/ML
INJECTION, SOLUTION INTRAVENOUS CONTINUOUS
Status: DISCONTINUED | OUTPATIENT
Start: 2020-10-07 | End: 2020-10-08

## 2020-10-07 RX ORDER — SODIUM BICARBONATE 325 MG/1
650 TABLET ORAL 3 TIMES DAILY
COMMUNITY
Start: 2020-08-20

## 2020-10-07 RX ORDER — ATROPINE SULFATE 10 MG/ML
1 SOLUTION/ DROPS OPHTHALMIC 2 TIMES DAILY
Status: DISCONTINUED | OUTPATIENT
Start: 2020-10-07 | End: 2020-10-07 | Stop reason: SDUPTHER

## 2020-10-07 RX ORDER — PREDNISOLONE ACETATE 10 MG/ML
1 SUSPENSION/ DROPS OPHTHALMIC DAILY
Status: DISCONTINUED | OUTPATIENT
Start: 2020-10-07 | End: 2020-10-09 | Stop reason: HOSPADM

## 2020-10-07 RX ORDER — INSULIN GLARGINE 100 [IU]/ML
10 INJECTION, SOLUTION SUBCUTANEOUS NIGHTLY
COMMUNITY
Start: 2019-11-05

## 2020-10-07 RX ORDER — TIMOLOL MALEATE 5 MG/ML
1 SOLUTION/ DROPS OPHTHALMIC 2 TIMES DAILY
COMMUNITY

## 2020-10-07 RX ORDER — ATROPINE SULFATE 10 MG/ML
1 SOLUTION/ DROPS OPHTHALMIC 2 TIMES DAILY
Status: DISCONTINUED | OUTPATIENT
Start: 2020-10-07 | End: 2020-10-09 | Stop reason: HOSPADM

## 2020-10-07 RX ORDER — FERROUS SULFATE 325(65) MG
325 TABLET ORAL DAILY
COMMUNITY
Start: 2020-06-04

## 2020-10-07 RX ORDER — SODIUM BICARBONATE 650 MG/1
650 TABLET ORAL 3 TIMES DAILY
Status: DISCONTINUED | OUTPATIENT
Start: 2020-10-07 | End: 2020-10-09 | Stop reason: HOSPADM

## 2020-10-07 RX ORDER — LATANOPROST 50 UG/ML
1 SOLUTION/ DROPS OPHTHALMIC NIGHTLY
Status: ON HOLD | COMMUNITY
Start: 2019-04-15 | End: 2020-10-07

## 2020-10-07 RX ORDER — TIMOLOL MALEATE 5 MG/ML
1 SOLUTION/ DROPS OPHTHALMIC 2 TIMES DAILY
Status: DISCONTINUED | OUTPATIENT
Start: 2020-10-07 | End: 2020-10-09 | Stop reason: HOSPADM

## 2020-10-07 RX ORDER — BRIMONIDINE TARTRATE 2 MG/ML
1 SOLUTION/ DROPS OPHTHALMIC EVERY 8 HOURS SCHEDULED
Status: DISCONTINUED | OUTPATIENT
Start: 2020-10-07 | End: 2020-10-09 | Stop reason: HOSPADM

## 2020-10-07 RX ORDER — ERYTHROMYCIN 5 MG/G
1 OINTMENT OPHTHALMIC NIGHTLY
COMMUNITY
Start: 2019-12-17

## 2020-10-07 RX ORDER — INSULIN GLARGINE 100 [IU]/ML
10 INJECTION, SOLUTION SUBCUTANEOUS NIGHTLY
Status: DISCONTINUED | OUTPATIENT
Start: 2020-10-07 | End: 2020-10-09 | Stop reason: HOSPADM

## 2020-10-07 RX ORDER — BRIMONIDINE TARTRATE 2 MG/ML
1 SOLUTION/ DROPS OPHTHALMIC 3 TIMES DAILY
Status: DISCONTINUED | OUTPATIENT
Start: 2020-10-07 | End: 2020-10-07 | Stop reason: SDUPTHER

## 2020-10-07 RX ORDER — FUROSEMIDE 40 MG/1
40 TABLET ORAL DAILY
Status: ON HOLD | COMMUNITY
Start: 2020-06-29 | End: 2020-10-09 | Stop reason: HOSPADM

## 2020-10-07 RX ORDER — BRIMONIDINE TARTRATE 2 MG/ML
1 SOLUTION/ DROPS OPHTHALMIC 3 TIMES DAILY
COMMUNITY
Start: 2019-01-08

## 2020-10-07 RX ORDER — NETARSUDIL AND LATANOPROST OPHTHALMIC SOLUTION, 0.02%/0.005% .2; .05 MG/ML; MG/ML
1 SOLUTION/ DROPS OPHTHALMIC; TOPICAL DAILY
COMMUNITY

## 2020-10-07 RX ORDER — ERYTHROMYCIN 5 MG/G
OINTMENT OPHTHALMIC NIGHTLY
Status: DISCONTINUED | OUTPATIENT
Start: 2020-10-07 | End: 2020-10-09 | Stop reason: HOSPADM

## 2020-10-07 RX ORDER — CYCLOBENZAPRINE HCL 10 MG
10 TABLET ORAL 3 TIMES DAILY PRN
Status: DISCONTINUED | OUTPATIENT
Start: 2020-10-07 | End: 2020-10-09 | Stop reason: HOSPADM

## 2020-10-07 RX ADMIN — CYCLOBENZAPRINE 10 MG: 10 TABLET, FILM COATED ORAL at 23:13

## 2020-10-07 RX ADMIN — ATENOLOL 25 MG: 25 TABLET ORAL at 09:06

## 2020-10-07 RX ADMIN — ATORVASTATIN CALCIUM 10 MG: 10 TABLET, FILM COATED ORAL at 09:06

## 2020-10-07 RX ADMIN — SODIUM CHLORIDE: 9 INJECTION, SOLUTION INTRAVENOUS at 16:48

## 2020-10-07 RX ADMIN — BRIMONIDINE TARTRATE 1 DROP: 2 SOLUTION OPHTHALMIC at 14:13

## 2020-10-07 RX ADMIN — SODIUM CHLORIDE: 9 INJECTION, SOLUTION INTRAVENOUS at 00:52

## 2020-10-07 RX ADMIN — BRIMONIDINE TARTRATE 1 DROP: 2 SOLUTION OPHTHALMIC at 20:03

## 2020-10-07 RX ADMIN — INSULIN LISPRO 4 UNITS: 100 INJECTION, SOLUTION INTRAVENOUS; SUBCUTANEOUS at 13:24

## 2020-10-07 RX ADMIN — TRAMADOL HYDROCHLORIDE 50 MG: 50 TABLET, FILM COATED ORAL at 23:13

## 2020-10-07 RX ADMIN — PREDNISOLONE ACETATE 1 DROP: 10 SUSPENSION/ DROPS OPHTHALMIC at 14:13

## 2020-10-07 RX ADMIN — LISINOPRIL 2.5 MG: 2.5 TABLET ORAL at 09:06

## 2020-10-07 RX ADMIN — CYCLOBENZAPRINE 10 MG: 10 TABLET, FILM COATED ORAL at 01:15

## 2020-10-07 RX ADMIN — ATROPINE SULFATE 1 DROP: 10 SOLUTION OPHTHALMIC at 14:14

## 2020-10-07 RX ADMIN — SODIUM CHLORIDE: 9 INJECTION, SOLUTION INTRAVENOUS at 14:34

## 2020-10-07 RX ADMIN — CEFTRIAXONE SODIUM 1 G: 1 INJECTION, POWDER, FOR SOLUTION INTRAMUSCULAR; INTRAVENOUS at 20:02

## 2020-10-07 RX ADMIN — INSULIN LISPRO 2 UNITS: 100 INJECTION, SOLUTION INTRAVENOUS; SUBCUTANEOUS at 09:14

## 2020-10-07 RX ADMIN — INSULIN LISPRO 1 UNITS: 100 INJECTION, SOLUTION INTRAVENOUS; SUBCUTANEOUS at 20:11

## 2020-10-07 RX ADMIN — TIMOLOL MALEATE 1 DROP: 5 SOLUTION/ DROPS OPHTHALMIC at 14:14

## 2020-10-07 RX ADMIN — ATROPINE SULFATE 1 DROP: 10 SOLUTION OPHTHALMIC at 20:03

## 2020-10-07 RX ADMIN — TIMOLOL MALEATE 1 DROP: 5 SOLUTION/ DROPS OPHTHALMIC at 20:03

## 2020-10-07 RX ADMIN — SODIUM BICARBONATE 650 MG TABLET 650 MG: at 20:05

## 2020-10-07 RX ADMIN — ASPIRIN 81 MG: 81 TABLET ORAL at 09:06

## 2020-10-07 RX ADMIN — TRAMADOL HYDROCHLORIDE 50 MG: 50 TABLET, FILM COATED ORAL at 00:45

## 2020-10-07 ASSESSMENT — PAIN SCALES - GENERAL
PAINLEVEL_OUTOF10: 0
PAINLEVEL_OUTOF10: 7
PAINLEVEL_OUTOF10: 3
PAINLEVEL_OUTOF10: 6
PAINLEVEL_OUTOF10: 3

## 2020-10-07 ASSESSMENT — PAIN DESCRIPTION - ONSET
ONSET: ON-GOING
ONSET: ON-GOING

## 2020-10-07 ASSESSMENT — PAIN DESCRIPTION - ORIENTATION
ORIENTATION: LEFT

## 2020-10-07 ASSESSMENT — PAIN DESCRIPTION - PAIN TYPE
TYPE: ACUTE PAIN

## 2020-10-07 ASSESSMENT — PAIN DESCRIPTION - LOCATION
LOCATION: GROIN

## 2020-10-07 ASSESSMENT — PAIN DESCRIPTION - FREQUENCY: FREQUENCY: INTERMITTENT

## 2020-10-07 ASSESSMENT — PAIN DESCRIPTION - DESCRIPTORS
DESCRIPTORS: SHARP
DESCRIPTORS: SHARP

## 2020-10-07 NOTE — H&P
Hospital Medicine History & Physical      PCP: Mikal Galindo MD    Date of Admission: 10/6/2020    Date of Service: Pt seen/examined on 10/6/2020 and Admitted to observation with expected LOS less than two midnights due to medical therapy. Chief Complaint:  Left hip pain    History Of Present Illness:      80 y.o. male, with PMH of HTN, HLD, DM and CKD, who presented to [facility] with left hip pain. History obtained from the patient and review of EMR. The patient stated this evening he was putting on his chains and suddenly experienced severe left hip pain. Per EMR, the patient's family reported when this occurred the patient \"passed out\". The patient did obtain an abrasion to the left forearm from the fall, but did not hit his head. He denies any recent trauma, injury and/or surgery to his left hip. In the emergency room an x-ray of his left pelvis and hip was obtained that revealed no acute fracture. A CT left hip was also obtained that revealed no acute osseous abnormality. Increased size and attenuation of the left psoas and iliac muscle compatible with intramuscular hemorrhage. An H&H was obtained that showed a chronic normocytic anemia. Per EMR, orthopedic surgery was consulted in the emergency room. Upon the patient's work-up he was also found to have a urinary tract infection. He was given ceftriaxone. The patient will be admitted for further evaluation. He denied any other associated symptoms as well as any aggravating and/or alleviating factors. At the time of this assessment, the patient was resting comfortably in bed. He currently denies any chest pain, back pain, abdominal pain, shortness of breath, numbness, tingling, N/V/C/D, fever and/or chills.      Past Medical History:          Diagnosis Date    Aortic aneurysm ([address]     aortic stent in place    Arthritis     Cancer ([facility]     skin    Diabetes mellitus ([address]     Hyperlipidemia     Hypertension     Urinary problem     self cath tid    Wears glasses      Past Surgical History:          Procedure Laterality Date    CATARACT REMOVAL WITH IMPLANT  3/2012    LUNG BIOPSY      negative    OTHER SURGICAL HISTORY      aortic stent for aneurysm    TURP       Medications Prior to Admission:      Prior to Admission medications    Medication Sig Start Date End Date Taking? Authorizing Provider   ondansetron (ZOFRAN) 4 MG tablet Take 1 tablet by mouth every 8 hours as needed for Nausea 6/9/18   Jose Murillo PA-C   atenolol (TENORMIN) 25 MG tablet Take 25 mg by mouth daily    Historical Provider, MD   glyBURIDE (DIABETA) 5 MG tablet Take 10 mg by mouth 2 times daily. Historical Provider, MD   lisinopril (PRINIVIL;ZESTRIL) 2.5 MG tablet Take 2.5 mg by mouth daily. Historical Provider, MD   lovastatin (MEVACOR) 20 MG tablet Take 20 mg by mouth daily. Historical Provider, MD   Dorzolamide HCl-Timolol Mal (COSOPT OP) Apply 1 drop to eye 2 times daily. Both eyes     Historical Provider, MD   prednisoLONE acetate (PRED FORTE) 1 % ophthalmic suspension Place 1 drop into the left eye 2 times daily. Historical Provider, MD   aspirin 81 MG EC tablet Take 81 mg by mouth daily. Historical Provider, MD   atropine 1 % ophthalmic solution Place 1 drop into the left eye 2 times daily. Historical Provider, MD     Allergies:  Patient has no known allergies. Social History:      The patient currently lives at home    TOBACCO:   reports that he quit smoking about 29 years ago. He has never used smokeless tobacco.  ETOH:   reports no history of alcohol use. E-Cigarettes Vaping or Juuling     Questions Responses    Vaping Use     Start Date     Does device contain nicotine? Quit Date     Vaping Type         Family History:      History reviewed. No pertinent family history. REVIEW OF SYSTEMS:   Pertinent positives as noted in the HPI. All other systems reviewed and negative.     PHYSICAL EXAM PERFORMED:    BP (!) 148/68   Pulse 94   Temp 97.6 °F (36.4 °C) (Oral)   Resp 16   Wt 174 lb (78.9 kg)   SpO2 100%   BMI 21.18 kg/m²     General appearance:  Pleasant, elderly male in no apparent distress, appears stated age and cooperative. HEENT:  Pupils equal, round, and reactive to light. Extra ocular muscles intact. Conjunctivae/corneas clear. Neck: Supple, with full range of motion. No jugular venous distention. Trachea midline. Respiratory:  Normal respiratory effort. Clear to auscultation, bilaterally without Rales/Wheezes/Rhonchi. Cardiovascular:  Regular rate and rhythm with normal S1/S2 without murmurs, rubs or gallops. Abdomen: Soft, non-tender, non-distended with normal bowel sounds. Musculoskeletal:  No clubbing, cyanosis or edema bilaterally. decreased ROM LLE d/t pain  Skin: Skin color, texture, turgor normal.  No significant rashes or lesions. Neurologic:  Neurovascularly intact. Cranial nerves: II-XII intact, grossly non-focal.  Psychiatric:  Alert and oriented, thought content appropriate, normal insight  Capillary Refill: Brisk,< 3 seconds   Peripheral Pulses: +2 palpable, equal bilaterally     Labs:     Recent Labs     10/06/20  1514 10/06/20  2005   WBC 11.6* 10.3   HGB 9.9* 9.5*   HCT 29.4* 28.3*    138     Recent Labs     10/06/20  1514   *   K 4.5   CL 99   CO2 20*   BUN 73*   CREATININE 3.6*   CALCIUM 8.6     Recent Labs     10/06/20  1514   AST 15   ALT 8*   BILITOT 0.3   ALKPHOS 74     Recent Labs     10/06/20  1514   TROPONINI 0.03*     Urinalysis:      Lab Results   Component Value Date    NITRU Negative 10/06/2020    BLOODU Negative 10/06/2020    SPECGRAV 1.020 10/06/2020    GLUCOSEU 100 10/06/2020     Radiology:     CXR: I have reviewed the CXR with the following interpretation: No acute cardiopulmonary findings    EKG:  I have reviewed the EKG with the following interpretation: Sinus rhythm with 1st degree A-V block. Abnormal P waves. Left anterior fascicular block.  Abnormal ECG.  Confirmed by Keith Muñiz MD, Emanuel Rodriguez ([id number]) on 10/6/2020 4:48:41 PM     CT HIP LEFT WO CONTRAST   Final Result   1. No acute osseous abnormality. 2. Increased size and attenuation of the left psoas and iliacus muscles   compatible with intramuscular hemorrhage. 3. Bilateral inguinal hernias containing loops of nonobstructed bowel. VL Extremity Venous Left   Final Result      CT HEAD WO CONTRAST   Final Result   No acute intracranial abnormality. CT CERVICAL SPINE WO CONTRAST   Final Result   No evidence for fracture or malalignment of the cervical spine. XR HIP 2-3 VW W PELVIS LEFT   Final Result   No acute fracture         XR CHEST PORTABLE   Final Result   No radiographic evidence of acute pulmonary disease. ASSESSMENT:    Active Hospital Problems    Diagnosis Date Noted    Diabetes mellitus ([address] [E11.9]     Hyperlipidemia [E78.5]     Hypertension [I10]     Left hip pain [M25.552]      PLAN:    Left hip pain in setting of no trauma or recent injury  -X-ray left pelvis and hip revealed no acute fracture  -CT left hip revealed no acute osseous abnormality.   Increased size and attenuation of the left psoas and iliac  muscles compatible with intramuscular hemorrhage  -1 L NS bolus given in ED  -up with assistance  -pt/ot eval  -prn pain medication   -orthopedic surgery consulted in ED - appreciate recommendations in advance  -Vascular surgery consult-appreciate recommendations in advance    Acute cystitis without hematuria  -UA positive  WBC and 1+ bacteria  -ceftriaxone given in ED and continued 10/6/2020    Essential HTN  -continue atenolol and lisinopril    HLD  -continue lovastatin    DM2, uncontrolled  -  -hemglobin a1c in am  -hold home medications  -mdssi  -poct ac/hs  -hypoglycemia protocol  -carb control diet    CKD, CR 3.6 and GFR 16 on admission  -baseline CR and GFR appears to be 1.8 / 32  -bmp in am  -nephrology consult - appreciate recommendations in advance    Elevated troponin, 0.03 on admission  -likely 2/2 demand ischemia d/t CKD  -no c/o chest pain  -tele monitoring  -trend troponin    Chronic normocytic anemia, 9.5/28.3 on admission  -pt with muscle hemorrhage  -cbc every 8 hours    DVT Prophylaxis: SCDs    Diet: No diet orders on file     Code Status: No Order    PT/OT Eval Status: ordered    Dispo - 1-2 days pending clinical improvement     Hammad Giordano, APRN - CNP    Thank you Francesco Apodaca MD for the opportunity to be involved in this patient's care.  If you have any questions or concerns please feel free to contact me at [phone].  --------------------------------------Dr. Hailey Connolly--------------------------------------

## 2020-10-07 NOTE — PROGRESS NOTES
Vascular    Probable Ilio psoas hematoma- had fall several weeks ago and continued pain. Recc hold ASA and any anticoagualtion, serial Hct and check coags. This should resolve without intervention.

## 2020-10-07 NOTE — CONSULTS
(ALPHAGAN) 0.2 % ophthalmic solution Place 1 drop into the left eye 3 times daily      erythromycin (ROMYCIN) 5 MG/GM ophthalmic ointment Place 1 cm into the right eye nightly Lower eyelid      SITagliptin (JANUVIA) 25 MG tablet Take 25 mg by mouth daily      sodium bicarbonate 325 MG tablet Take 650 mg by mouth 3 times daily      Cholecalciferol 50 MCG (2000 UT) CAPS Take 1 capsule by mouth daily      timolol (TIMOPTIC) 0.5 % ophthalmic solution Place 1 drop into the right eye 2 times daily      Netarsudil-Latanoprost (ROCKLATAN) 0.02-0.005 % SOLN Apply 1 drop to eye daily      ondansetron (ZOFRAN) 4 MG tablet Take 1 tablet by mouth every 8 hours as needed for Nausea 10 tablet 0    lisinopril (PRINIVIL;ZESTRIL) 2.5 MG tablet Take 2.5 mg by mouth daily.  lovastatin (MEVACOR) 20 MG tablet Take 20 mg by mouth daily.  Dorzolamide HCl-Timolol Mal (COSOPT OP) Apply 1 drop to eye 2 times daily. Both eyes       prednisoLONE acetate (PRED FORTE) 1 % ophthalmic suspension Place 1 drop into the left eye 2 times daily.  aspirin 81 MG EC tablet Take 81 mg by mouth daily.  atropine 1 % ophthalmic solution Place 1 drop into the left eye 2 times daily. Allergies:  No known allergies    Social History:    Social History     Socioeconomic History    Marital status:       Spouse name: Not on file    Number of children: Not on file    Years of education: Not on file    Highest education level: Not on file   Occupational History    Not on file   Social Needs    Financial resource strain: Not on file    Food insecurity     Worry: Not on file     Inability: Not on file    Transportation needs     Medical: Not on file     Non-medical: Not on file   Tobacco Use    Smoking status: Former Smoker     Last attempt to quit: 3/28/1991     Years since quittin.5    Smokeless tobacco: Never Used   Substance and Sexual Activity    Alcohol use: No    Drug use: No    Sexual activity: Not on file   Lifestyle    Physical activity     Days per week: Not on file     Minutes per session: Not on file    Stress: Not on file   Relationships    Social connections     Talks on phone: Not on file     Gets together: Not on file     Attends [employer] service: Not on file     Active member of club or organization: Not on file     Attends meetings of clubs or organizations: Not on file     Relationship status: Not on file    Intimate partner violence     Fear of current or ex partner: Not on file     Emotionally abused: Not on file     Physically abused: Not on file     Forced sexual activity: Not on file   Other Topics Concern    Not on file   Social History Narrative    Not on file       Family History:   No history of kidney disease. Review of Systems:   Pertinent positives stated above in HPI. Remainder of 10 point review of systems were reviewed and were negative.     Physical exam:   Constitutional:  VITALS:  BP (!) 168/65   Pulse 69   Temp 97.9 °F (36.6 °C) (Oral)   Resp 18   Ht 6' 4\" (1.93 m)   Wt 174 lb (78.9 kg)   SpO2 98%   BMI 21.18 kg/m²   Gen: alert, awake, ill-appearing  Skin: no rash, turgor wnl  Heent:  eomi, mmm  Neck: no bruits or jvd noted, thyroid normal  Cardiovascular:  S1, S2 without m/r/g  Respiratory: CTA B without w/r/r; respiratory effort normal  Abdomen:  +bs, soft, nt, nd, no hepatosplenomegaly  Ext: + lower extremity pedal edema  Psychiatric: mood and affect appropriate; judgement and insight intact  Musculoskeletal:  Rom, muscular strength limited; digits, nails normal    Data/  CBC:   Lab Results   Component Value Date    WBC 7.7 10/07/2020    RBC 2.73 10/07/2020    HGB 8.3 10/07/2020    HCT 24.4 10/07/2020    MCV 87.5 10/07/2020    MCH 29.0 10/07/2020    MCHC 33.2 10/07/2020    RDW 15.7 10/07/2020     10/07/2020    MPV 8.1 10/07/2020     BMP:    Lab Results   Component Value Date     10/07/2020    K 4.2 10/07/2020     10/07/2020 CO2 22 10/07/2020    BUN 70 10/07/2020    LABALBU 3.2 10/06/2020    CREATININE 3.5 10/07/2020    CALCIUM 8.3 10/07/2020    GFRAA 20 10/07/2020    GFRAA 46 08/10/2012    LABGLOM 16 10/07/2020    GLUCOSE 166 10/07/2020         Assessment/    Chronic kidney disease stage 5  - Etiology: DM Nephropathy  - Data: Scr mid to high 3 range - stable at baseline  Follows with Dr. Ethelyn Angelucci in our office    Hypertension c/b +Orthostatics    Anemia of chronic disease - started on Epogen 5K v0kpgmg as outpatient    Metabolic acidosis  - On oral sodium bicarbonate 650 mg po tid    Left hip pain/intramuscular hemorrhage  - Plans per Vascular surgery    UTI  - Plans per Admitting       Plan/    - Continue IVF's with NS 75 ml/hour given continued +orthostatics  - Can resume lasix 40 mg a day for volume control from home MAR once no longer orthostatic  - Monitoring off of Ace-I/Arb with worsening renal function from outpatient  - Hold ARELIS dosing for now given higher BP trend with +orthostatics - has not yet started as outpatient  - No plans for dialysis based on discussions that patient has had with Dr. Ethelyn Angelucci & no anticipated needs at this time  - Trend labs, bp's      Thank you for the consultation. Please do not hesitate to call with questions.     [employer]

## 2020-10-07 NOTE — CARE COORDINATION
CASE MANAGEMENT INITIAL ASSESSMENT      Reviewed chart and completed assessment with:Patient and son Shanelle Huerta  Explained Case Management role/services. Reviewed    Primary contact information:Leeann Blanchard [phone]    Health Care Decision Maker: Reviewed  Who do you trust or have selected to make healthcare decisions for you? Name:   Priscilla Carmen  Phone  Number: [phone]  Can this person be reached and be able to respond quickly, such as within a few minutes or hours? Yes  Who would be your back-up decision maker? Name Alvin Sanchez  Phone [phone] Eileen Peña date/status:Observation 10/6/2020  Diagnosis:left hip pain    Is this a Readmission?:  No      Insurance:Medicare   Precert required for SNF: No       3 night stay required: Yes    Living arrangements, Adls, care needs, prior to admission: Patient reports living at home with son Vianey Ponce. Ramírez assist with cooking and cleaning but Patient was ambulating with cane or RW prior to admission. Patient reports just depending on how he is feeling as to which assistive device he uses. Transportation: Denies needs, Son Shanelle Huerta supports need    Durable Medical Equipment at home:  Walker_x_Cane_x_RTS__ BSC__Shower Chair_x_  02__ HHN__ CPAP__  BiPap__  Hospital Bed__ W/C___ Other__not raised toilet seat but does have hand rails on toilet ________    Services in the home and/or outpatient, prior to admission: Follow by [facility] and agreeable to reconnecting services at [place]. PT/OT recs: Will follow for recs. Hospital Exemption Notification (HEN): Only for SNF    Barriers to discharge:Increasecd weakness, IPTA with lower level ADLs now needing assist     Plan/comments:Patient reports goal is to return home IPT with lower level ADL and son Vianey Ponce to ct to assist with cooking and cleaning and Shanelle Huerta to assist with transportation and laundry. Patient will have 4 steps to enter home and bed/bath on one level once inside.  Patient reports using RW and cane at home, depending how he is feeling that day. [facility] following every other month, agreeable to increased [facility] services at [place] if recommended. Call placed to [facility] to follow for [facility] recs. Patient will have 24 hr assist at home. SW will follow for recs.    BOGDAN Garner on chart for MD signature

## 2020-10-07 NOTE — CONSULTS
Ortho Note:    Dr. Kendrick Lopez consulted in the ED for left psoas and iliacus muscle hemorrhage. Discussed recommendation for vascular surgery consult. Dr. Colin Deckerred aware and has ordered CT with contrast for further eval.     Reviewed CT with Dr. Kendrick Lopez today, no acute fractures noted. Defer to Vascular surgery for recs. Please reconsult for any  Issues    Minerva Nissen PA-C      Addendum:  I agree with the above note and care plan, regarding orthopedic consultation. Advanced imaging negative for fracture or dislocation. Patient with hemorrhage into the psoas musculature. Would recommend rest, pain medication, physical therapy consultation and vascular surgery recommendations. If surgical intervention of the psoas indicated, this would be out of the scope of orthopedic practice, this would require intervention by either spine surgery, vascular surgery, or general surgery. This muscle is along the spinal column and in the retroperitoneum. Feel free to call with any further questions or concerns. Blank Villafana MD  Hand & Upper Extremity Surgery  [address] [place] [address]      Please note that this transcription was created using voice recognition software. Any errors are unintentional and may be due to voice recognition transcription.

## 2020-10-07 NOTE — PLAN OF CARE
Pt educated on the need to turn every 2 hours to prevent any skin integrity breakdown. Pt verbalizes understanding. Pt refuses repositions at this time. See documentation.

## 2020-10-07 NOTE — ED NOTES

## 2020-10-07 NOTE — PROGRESS NOTES
Occupational Therapy   Occupational Therapy Initial Assessment  Date: 10/7/2020   Patient Name: Birgti George  MRN: [id number]     : 10/17/1927    Date of Service: 10/7/2020    Discharge Recommendations:  24 hour supervision or assist(initially)  OT Equipment Recommendations  Equipment Needed: No    Assessment   Performance deficits / Impairments: Decreased functional mobility ; Decreased endurance;Decreased ADL status; Decreased balance;Decreased high-level IADLs  Assessment: Pt reports typically IND with ADL and light IADL, and higher-level IADL prior to March of this year. Pt presented grossly at [facility] for functional transfers and brief mobility with walker, not reporting dizziness this session, but continue to assess for orthostatic hypotension. Continue OT tx. Prognosis: Good  Decision Making: Low Complexity  OT Education: OT Role;Plan of Care;Precautions; ADL Adaptive Strategies;Transfer Training;Energy Conservation;Equipment;Orientation  Patient Education: importance of mobility, sitting up in chair for meals  Barriers to Learning: Pt verbalized understanding  REQUIRES OT FOLLOW UP: Yes  Activity Tolerance  Activity Tolerance: Patient limited by pain; Patient limited by fatigue  Activity Tolerance: Pt /79 (HR 79) in supine, 131/62 (HR 67) at EOB, 15/62 (HR 75) in stance - no c/o dizziness - RN present and aware  Safety Devices  Safety Devices in place: Yes  Type of devices: Gait belt;Call light within reach;Nurse notified; Bed alarm in place; Left in bed           Patient Diagnosis(es): The primary encounter diagnosis was Syncope and collapse. Diagnoses of Orthostatic hypotension and Intramuscular hematoma were also pertinent to this visit. has a past medical history of Aortic aneurysm ([facility] [address] Arthritis, Cancer ([facility] [address] CKD (chronic kidney disease) stage 5, GFR less than 15 ml/min ([address] Diabetes mellitus ([facility] [address] Hyperlipidemia, Hypertension, Urinary problem, and Wears glasses.    has a past surgical history that includes TURP; Lung biopsy; other surgical history; and Cataract removal with implant (3/2012).            Restrictions  Restrictions/Precautions  Restrictions/Precautions: General Precautions, Fall Risk  Position Activity Restriction  Other position/activity restrictions: up with assist    Subjective   General  Chart Reviewed: Yes  Patient assessed for rehabilitation services?: Yes  Family / Caregiver Present: No  Referring Practitioner: BRIAN Montoya CNP  Diagnosis: CC: L hip pain, s/p fall; Probable Ilio psoas hematoma  Subjective  Subjective: Pt in bed on arrival, pleasant and agreeable to eval and treat  General Comment  Comments: Per RN okay to treat - orthosatic earlier this date  Patient Currently in Pain: Yes(Pt with signs and c/o intermittent \"sharp pain\" at L hip, declined intervention besides repositioning and rest)  Vital Signs  Patient Currently in Pain: Yes(Pt with signs and c/o intermittent \"sharp pain\" at L hip, declined intervention besides repositioning and rest)  Social/Functional History  Social/Functional History  Lives With: Son  Type of Home: House  Home Layout: Two level, Able to Live on Main level with bedroom/bathroom, Bed/Bath upstairs  Home Access: Stairs to enter with rails  Entrance Stairs - Number of Steps: 4  Entrance Stairs - Rails: Right  Bathroom Shower/Tub: Tub/Shower unit, Shower chair without back  Bathroom Toilet: Standard  Bathroom Equipment: Grab bars around toilet, Hand-held shower  Home Equipment: Cane, Rolling walker, Reacher  ADL Assistance: Independent  Homemaking Assistance: (pt makes small meals for himself)  Homemaking Responsibilities: Yes(pt no longer performs shopping d/t COVID)  Meal Prep Responsibility: Secondary  Health Care Management: Primary  Ambulation Assistance: Independent(usually with cane; use of walker recently d/t pain at L hip)  Transfer Assistance: Independent  Active : (has been driving until March of this year)  Occupation: Retired  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 Leisure & Hobbies: puzzles       Objective        Orientation  Overall Orientation Status: Within Normal Limits     Balance  Sitting Balance: Supervision  Standing Balance: Contact guard assistance  Standing Balance  Time: 1-2 minutes  Activity: mobility  Functional Mobility  Functional - Mobility Device: Standard Walker  Activity: (4 sidesteps toward [address] for better positioning in supine)  Assist Level: Contact guard assistance  ADL  Feeding: Setup; Beverage management  Grooming: Setup;Supervision(seated EOB)  LE Dressing: Supervision(for socks at EOB)  Toileting: (Pt self-catheterizes at baseline)        Bed mobility  Supine to Sit: Stand by assistance  Sit to Supine: Stand by assistance  Scooting: Stand by assistance(to EOB)  Transfers  Sit to stand: Contact guard assistance  Stand to sit: Contact guard assistance     Cognition  Overall Cognitive Status: WFL  Cognition Comment: Some repetition requried d/t pt [place]        Sensation  Overall Sensation Status: Impaired(pt reports numbness in feet 2/2 diabetic neuropathy)        LUE AROM (degrees)  LUE AROM : WFL  Left Hand AROM (degrees)  Left Hand AROM: WFL  RUE AROM (degrees)  RUE AROM : WFL  Right Hand AROM (degrees)  Right Hand AROM: WFL  LUE Strength  Gross LUE Strength: WFL  RUE Strength  Gross RUE Strength: WFL          Plan   Plan  Times per week: 3-5x/week  Current Treatment Recommendations: Strengthening, Balance Training, Functional Mobility Training, Endurance Training, Safety Education & Training, Self-Care / ADL, Home Management Training, Patient/Caregiver Education & Training, Equipment Evaluation, Education, & procurement, Gait Training, Stair training, Positioning, Pain Management    AM-PAC Score        AM-[facility] Inpatient Daily Activity Raw Score: 20 (10/07/20 1659)  AM-PAC Inpatient ADL T-Scale Score : 42.03 (10/07/20 1659)  ADL Inpatient CMS 0-100% Score: 38.32 (10/07/20 1659)  ADL Inpatient CMS G-Code Modifier : CJ (10/07/20 2336)    Goals  Short term goals  Time Frame for Short term goals: 1 week (by 9/14/20)  Short term goal 1: Pt will complete functional transfers with Supervision or better  Short term goal 2: Pt will complete LE dressing with setup, use of AE as needed  Short term goal 3: Pt will tolerate 3 minutes dynamic standing activity with SBA in preparation for standing ADL/IADL by 10/10  Patient Goals   Patient goals : \"to go home\"       Therapy Time   Individual Concurrent Group Co-treatment   Time In 1545(10 minutes for eval)         Time Out 1610         Minutes 25         Timed Code Treatment Minutes: 15 Minutes     This note to serve as discharge summary should pt discharge prior to next session.     Katie Tasia, OTR/L

## 2020-10-07 NOTE — PROGRESS NOTES
Physical Therapy    Facility/Department: [facility] - MED SURG/ORTHO  Initial Assessment and treatment    NAME: Cassy Davis  : 10/17/1927  MRN: [id number]    Date of Service: 10/7/2020    Discharge Recommendations:  24 hour supervision or assist, Home with Home health PT   PT Equipment Recommendations  Equipment Needed: No    Assessment   Body structures, Functions, Activity limitations: Decreased functional mobility ; Decreased ROM; Decreased strength; Increased pain  Assessment: Pt referred for PT evluation during current hospital stay with a diagnosis of fall with left groin pain, negative for fracture per chairt review. Pt currently experiencing symptomatic orthostatic hypotension, unable to ambulate this date for this reason. Pt required SBA for bed mobility, CGA for sit<>stand from EOB and from toilet using grab bar. Pt would benefit from skilled acute PT to address deficits and to assess and progress ambulation when BP stabilizes. Recommend home with 24 hr sup/assist and HHPT at [place] from acute setting  Treatment Diagnosis: decreased balance  Prognosis: Good  Decision Making: Medium Complexity  PT Education: Goals;Gait Training;PT Role;Disease Specific Education;Plan of Care; Functional Mobility Training;Home Exercise Program;Precautions;Transfer Training  Patient Education: Pt verbalized understanding of importance of mobility while in acute setting  Barriers to Learning: no  REQUIRES PT FOLLOW UP: Yes  Activity Tolerance  Activity Tolerance: Treatment limited secondary to medical complications (free text)  Activity Tolerance: PT with symptomatic orthostatic hypostension; BP as follows: seated /67; standing 72/48 with dizziness and a tried feeling; supine in bed 165/68; after 3 min semireclined, 168/68       Patient Diagnosis(es): The primary encounter diagnosis was Syncope and collapse. Diagnoses of Orthostatic hypotension and Intramuscular hematoma were also pertinent to this visit.      has a past medical history of Aortic aneurysm ([facility] [address] Arthritis, Cancer ([facility] [address] Diabetes mellitus ([facility] [address] Hyperlipidemia, Hypertension, Urinary problem, and Wears glasses. has a past surgical history that includes TURP; Lung biopsy; other surgical history; and Cataract removal with implant (3/2012). Restrictions  Position Activity Restriction  Other position/activity restrictions: up with assist  Vision/Hearing  Vision: Impaired  Vision Exceptions: Wears glasses at all times  Hearing: Exceptions to [facility]  Hearing Exceptions: Hard of hearing/hearing concerns; No hearing aid     Subjective  General  Chart Reviewed: Yes  Patient assessed for rehabilitation services?: Yes  Response To Previous Treatment: Not applicable  Family / Caregiver Present: Yes(son)  Referring Practitioner: Chrissie Nguyen CNP  Referral Date : 10/07/20  Diagnosis: left hip pain, s/p fall  Follows Commands: Within Functional Limits  General Comment  Comments: Pt on commode upon entry with son in room, RN cleaeelina pt for therapy  Subjective  Subjective: Pt agreeable to PT  Pain Screening  Patient Currently in Pain: Yes  Pain Assessment  Pain Assessment: 0-10  Pain Level: 3  Pain Type: Acute pain  Pain Location: Groin  Pain Orientation: Left  Non-Pharmaceutical Pain Intervention(s): (offered ice, pt refused)  Vital Signs  Patient Currently in Pain: Yes  Pre Treatment Pain Screening  Intervention List: Patient able to continue with treatment    Orientation  Orientation  Overall Orientation Status: Within Normal Limits  Social/Functional History  Social/Functional History  Lives With: Son  Type of Home: House  Home Layout: Two level, Able to Live on Main level with bedroom/bathroom, Bed/Bath upstairs  Home Access: Stairs to enter with rails  Entrance Stairs - Number of Steps: 4  Entrance Stairs - Rails: Right  Bathroom Shower/Tub: Tub/Shower unit, Shower chair without back  Bathroom Toilet: Standard  Bathroom Equipment: Grab bars around toilet, Hand-held shower  Home Equipment: Cane, Rolling walker, Reacher  ADL Assistance: Independent  Homemaking Assistance: (pt makes small meals for himself)  Homemaking Responsibilities: No  Ambulation Assistance: Independent(usually with cane)  Transfer Assistance: Independent  Active : (has been driving until March of this year)  Occupation: Retired  Type of occupation: [employer]  Leisure & Hobbies: puzzles         Objective          AROM RLE (degrees)  RLE AROM: WFL  AROM LLE (degrees)  LLE AROM : WNL  Strength RLE  Strength RLE: WNL  Comment: grossly 4+/5 throughout  Strength LLE  Strength LLE: WFL  Comment: grossly 4+/5 knee and ankle, NT at hip but observed to be at least 3+/5        Bed mobility  Supine to Sit: Unable to assess(pt on commode upon entry)  Sit to Supine: Stand by assistance  Scooting: Stand by assistance(to HOB)  Transfers  Sit to Stand: (from toilet with grab bar and from EOB)  Stand to sit: Contact guard assistance  Bed to Chair: Dependent/Total(commode to bed using Clemens Dodrill as pt's BP has been orthostatic)  Ambulation  Ambulation?: No(pt's BP dropped with c/o feeling tired and dizzy, assisted pt back into bed at this time.)  WB Status: no restrictions     Balance  Posture: Fair  Sitting - Static: Good  Sitting - Dynamic: Good  Standing - Static: Fair  Exercises  Quad Sets: x 10 B  Gluteal Sets: x 10 B  Knee Long Arc Quad: x 10 B  Knee Short Arc Quad: x 10 B  Ankle Pumps: x 20 B     Plan   Plan  Times per week: 3-5  Current Treatment Recommendations: Strengthening, Neuromuscular Re-education, Home Exercise Program, ROM, Balance Training, Endurance Training, Functional Mobility Training, Transfer Training, Gait Training, Stair training  Safety Devices  Type of devices:  All fall risk precautions in place, Bed alarm in place, Call light within reach, Nurse notified, Left in bed, Patient at risk for falls, Gait belt                                                             AM-PAC Score  AM-PAC Inpatient Mobility Raw Score : 17 (10/07/20 1217)  AM-PAC Inpatient T-Scale Score : 42.13 (10/07/20 1217)  Mobility Inpatient CMS 0-100% Score: 50.57 (10/07/20 1217)  Mobility Inpatient CMS G-Code Modifier : CK (10/07/20 1217)          Goals  Short term goals  Time Frame for Short term goals: 10/11/20 unless noted  Short term goal 1: Pt will perform bed mobility with supervision by 10/10/20  Short term goal 2: Pt will perform transfers with SBA  Short term goal 3: Pt will ambulate 50 ft with LRAD and SBA  Short term goal 4: Pt will negotiate 4 stairs with LRAD and CGA  Patient Goals   Patient goals : \"to go home\"       Therapy Time   Individual Concurrent Group Co-treatment   Time In 1006         Time Out 1055         Minutes 49         Timed Code Treatment Minutes: 44 Minutes    If pt is discharged prior to next therapy session, this note will serve as discharge summary.     Navin Shea, PT

## 2020-10-07 NOTE — PROGRESS NOTES
4 Eyes Skin Assessment     The patient is being assess for   Admission    I agree that 2 RN's have performed a thorough Head to Toe Skin Assessment on the patient. ALL assessment sites listed below have been assessed. Areas assessed by both nurses:   [x]   Head, Face, and Ears   [x]   Shoulders, Back, and Chest, Abdomen  [x]   Arms, Elbows, and Hands   [x]   Coccyx, Sacrum, and Ischium  [x]   Legs, Feet, and Heels            **SHARE this note so that the co-signing nurse is able to place an eSignature**    Co-signer eSignature: Electronically signed by Michelle Romano RN on 10/7/20 at 5:47 AM EDT    Does the Patient have Skin Breakdown?   No          Kamari Prevention initiated:  NA   Wound Care Orders initiated:  NA      WOC nurse consulted for Pressure Injury (Stage 3,4, Unstageable, DTI, NWPT, Complex wounds)and New or Established Ostomies:  NA      Primary Nurse eSignature: Electronically signed by Bassam Fuller RN on 10/7/20 at 5:45 AM EDT

## 2020-10-07 NOTE — CONSULTS
nightly Lower eyelid 12/17/19  Yes Historical Provider, MD   SITagliptin (JANUVIA) 25 MG tablet Take 25 mg by mouth daily 1/12/20  Yes Historical Provider, MD   sodium bicarbonate 325 MG tablet Take 650 mg by mouth 3 times daily 8/20/20  Yes Historical Provider, MD   Cholecalciferol 50 MCG (2000 UT) CAPS Take 1 capsule by mouth daily    Historical Provider, MD   timolol (TIMOPTIC) 0.5 % ophthalmic solution Place 1 drop into the right eye 2 times daily    Historical Provider, MD   Netarsudil-Latanoprost (ROCKLATAN) 0.02-0.005 % SOLN Apply 1 drop to eye daily    Historical Provider, MD   ondansetron (ZOFRAN) 4 MG tablet Take 1 tablet by mouth every 8 hours as needed for Nausea 6/9/18   Gabi Stack PA-C   lisinopril (PRINIVIL;ZESTRIL) 2.5 MG tablet Take 2.5 mg by mouth daily. Historical Provider, MD   lovastatin (MEVACOR) 20 MG tablet Take 20 mg by mouth daily. Historical Provider, MD   Dorzolamide HCl-Timolol Mal (COSOPT OP) Apply 1 drop to eye 2 times daily. Both eyes     Historical Provider, MD   prednisoLONE acetate (PRED FORTE) 1 % ophthalmic suspension Place 1 drop into the left eye 2 times daily. Historical Provider, MD   aspirin 81 MG EC tablet Take 81 mg by mouth daily. Historical Provider, MD   atropine 1 % ophthalmic solution Place 1 drop into the left eye 2 times daily.       Historical Provider, MD        Facility Administered Medications:    atropine  1 drop Left Eye BID    brimonidine  1 drop Right Eye 3 times per day    prednisoLONE acetate  1 drop Left Eye Daily    timolol  1 drop Right Eye BID    erythromycin   Left Eye Nightly    [Held by provider] aspirin  81 mg Oral Daily    atenolol  25 mg Oral Daily    lisinopril  2.5 mg Oral Daily    atorvastatin  10 mg Oral Daily    insulin lispro  0-12 Units Subcutaneous TID WC    insulin lispro  0-6 Units Subcutaneous Nightly    sodium chloride flush  10 mL Intravenous 2 times per day    cefTRIAXone (ROCEPHIN) IV  1 g Intravenous Q24H       Allergies:  No known allergies     Social History:      Social History     Socioeconomic History    Marital status:      Spouse name: Not on file    Number of children: Not on file    Years of education: Not on file    Highest education level: Not on file   Occupational History    Not on file   Social Needs    Financial resource strain: Not on file    Food insecurity     Worry: Not on file     Inability: Not on file    Transportation needs     Medical: Not on file     Non-medical: Not on file   Tobacco Use    Smoking status: Former Smoker     Last attempt to quit: 3/28/1991     Years since quittin.5    Smokeless tobacco: Never Used   Substance and Sexual Activity    Alcohol use: No    Drug use: No    Sexual activity: Not on file   Lifestyle    Physical activity     Days per week: Not on file     Minutes per session: Not on file    Stress: Not on file   Relationships    Social connections     Talks on phone: Not on file     Gets together: Not on file     Attends Gnosticist service: Not on file     Active member of club or organization: Not on file     Attends meetings of clubs or organizations: Not on file     Relationship status: Not on file    Intimate partner violence     Fear of current or ex partner: Not on file     Emotionally abused: Not on file     Physically abused: Not on file     Forced sexual activity: Not on file   Other Topics Concern    Not on file   Social History Narrative    Not on file       Family History:    History reviewed. No pertinent family history. Review of Systems:  A 14 point review of systems was completed. Pertinent positives identified in the HPI, all other review of systems negative.       Physical Examination:    BP (!) 168/65   Pulse 69   Temp 97.9 °F (36.6 °C) (Oral)   Resp 18   Ht 6' 4\" (1.93 m)   Wt 174 lb (78.9 kg)   SpO2 98%   BMI 21.18 kg/m²        Admission Weight: 174 lb (78.9 kg)       General appearance: NAD  Eyes: PERRLA  Neck: no JVD, no lymphadenopathy. Respiratory: effort is unlabored, no crackles, wheezes or rubs. Cardiovascular: regular, no murmur. No carotid bruits. Pulses:    femoral   RIGHT 2   LEFT 2   GI: abdomen soft, nondistended, no organomegaly. Bilateral inguinal/scrotal hernias. Musculoskeletal: strength and tone normal.  Extremities: warm and pink. Skin: no dermatitis or ulceration. Neuro/psychiatric: grossly intact. MEDICAL DECISION MAKING/TESTING        CT:      Impression    1. No acute osseous abnormality. 2. Increased size and attenuation of the left psoas and iliacus muscles    compatible with intramuscular hemorrhage. 3. Bilateral inguinal hernias containing loops of nonobstructed bowel. Labs:   CBC:   Recent Labs     10/06/20  1514 10/06/20  2005 10/07/20  0600 10/07/20  1308   WBC 11.6* 10.3 7.7  --    HGB 9.9* 9.5* 7.9* 8.3*   HCT 29.4* 28.3* 23.9* 24.4*   MCV 86.2 86.9 87.5  --     138 130*  --      BMP:   Recent Labs     10/06/20  1514 10/07/20  0600   * 136   K 4.5 4.2   CL 99 103   CO2 20* 22   BUN 73* 70*   CREATININE 3.6* 3.5*   CALCIUM 8.6 8.3     Cardiac Enzymes:   Recent Labs     10/06/20  1514 10/07/20  0016 10/07/20  0600   TROPONINI 0.03* 0.03* 0.03*     PT/INR: No results for input(s): PROTIME, INR in the last 72 hours. APTT: No results for input(s): APTT in the last 72 hours.   Liver Profile:  Lab Results   Component Value Date    AST 15 10/06/2020    ALT 8 10/06/2020    BILITOT 0.3 10/06/2020    ALKPHOS 74 10/06/2020   No results found for: CHOL, HDL, TRIG  TSH:  No results found for: TSH  UA:   Lab Results   Component Value Date    COLORU Straw 10/06/2020    PHUR 6.0 10/06/2020    WBCUA  10/06/2020    RBCUA 0-2 10/06/2020    BACTERIA 1+ 10/06/2020    CLARITYU Clear 10/06/2020    SPECGRAV 1.020 10/06/2020    LEUKOCYTESUR TRACE 10/06/2020    UROBILINOGEN 0.2 10/06/2020    BILIRUBINUR Negative 10/06/2020    BLOODU Negative 10/06/2020    GLUCOSEU 100 10/06/2020           Diagnosis:  Ilio psoas hematoma. Plan: Observation with serial Hct. This is a spontaneous retroperitoneal bleed. The vast majority stop/tamponade and require no intervention. Recommend stopping ASA and any anticoagulation.   Check coag studies to make sure normal.

## 2020-10-07 NOTE — PROGRESS NOTES
Hospitalist Progress Note      PCP: Eros Roca MD    Date of Admission: 10/6/2020    Chief Complaint: Left hip pain    Hospital Course: Reviewed H&P    Subjective: Received call from Dr. Carmita Aguero (vascular) and discussed plan of care. Patient has intramuscular hematoma possible from recent minor trauma. Recommended to hold aspirin for now. Orthopedics evaluation pending. We will continue to manage conservatively for now. Monitor H&H every 8 hours and consider PRBC transfusion for hemoglobin less than 7.  -Patient appears comfortable at this time and denies any pain.       Medications:  Reviewed    Infusion Medications    sodium chloride 75 mL/hr at 10/07/20 1648    dextrose       Scheduled Medications    atropine  1 drop Left Eye BID    brimonidine  1 drop Right Eye 3 times per day    prednisoLONE acetate  1 drop Left Eye Daily    timolol  1 drop Right Eye BID    erythromycin   Left Eye Nightly    Netarsudil-Latanoprost  1 drop Right Eye Nightly    sodium bicarbonate  650 mg Oral TID    [Held by provider] aspirin  81 mg Oral Daily    atenolol  25 mg Oral Daily    atorvastatin  10 mg Oral Daily    insulin lispro  0-12 Units Subcutaneous TID WC    insulin lispro  0-6 Units Subcutaneous Nightly    sodium chloride flush  10 mL Intravenous 2 times per day    cefTRIAXone (ROCEPHIN) IV  1 g Intravenous Q24H     PRN Meds: cyclobenzaprine, glucose, dextrose, glucagon (rDNA), dextrose, sodium chloride flush, acetaminophen **OR** acetaminophen, polyethylene glycol, promethazine **OR** ondansetron, traMADol      Intake/Output Summary (Last 24 hours) at 10/7/2020 1724  Last data filed at 10/7/2020 1255  Gross per 24 hour   Intake 480 ml   Output 600 ml   Net -120 ml       Physical Exam Performed:  BP (!) 168/65   Pulse 69   Temp 97.9 °F (36.6 °C) (Oral)   Resp 18   Ht 6' 4\" (1.93 m)   Wt 174 lb (78.9 kg)   SpO2 98%   BMI 21.18 kg/m²     General appearance:  Pleasant, elderly male in no apparent distress, appears stated age and cooperative. HEENT:  Pupils equal, round, and reactive to light. Extra ocular muscles intact. Conjunctivae/corneas clear. Neck: Supple, with full range of motion. No jugular venous distention. Trachea midline. Respiratory:  Normal respiratory effort. Clear to auscultation, bilaterally without Rales/Wheezes/Rhonchi. Cardiovascular:  Regular rate and rhythm with normal S1/S2 without murmurs, rubs or gallops. Abdomen: Soft, non-tender, non-distended with normal bowel sounds. Musculoskeletal:  No clubbing, cyanosis or edema bilaterally. decreased ROM LLE d/t pain  Skin: Skin color, texture, turgor normal.  No significant rashes or lesions. Neurologic:  Neurovascularly intact. Cranial nerves: II-XII intact, grossly non-focal.  Psychiatric:  Alert and oriented, thought content appropriate, normal insight  Capillary Refill: Brisk,< 3 seconds   Peripheral Pulses: +2 palpable, equal bilaterally       Labs:   Recent Labs     10/06/20  1514 10/06/20  2005 10/07/20  0600 10/07/20  1308   WBC 11.6* 10.3 7.7  --    HGB 9.9* 9.5* 7.9* 8.3*   HCT 29.4* 28.3* 23.9* 24.4*    138 130*  --      Recent Labs     10/06/20  1514 10/07/20  0600   * 136   K 4.5 4.2   CL 99 103   CO2 20* 22   BUN 73* 70*   CREATININE 3.6* 3.5*   CALCIUM 8.6 8.3     Recent Labs     10/06/20  1514   AST 15   ALT 8*   BILITOT 0.3   ALKPHOS 74     No results for input(s): INR in the last 72 hours. Recent Labs     10/06/20  1514 10/07/20  0016 10/07/20  0600   TROPONINI 0.03* 0.03* 0.03*       Urinalysis:    Lab Results   Component Value Date    NITRU Negative 10/06/2020    WBCUA  10/06/2020    BACTERIA 1+ 10/06/2020    RBCUA 0-2 10/06/2020    BLOODU Negative 10/06/2020    SPECGRAV 1.020 10/06/2020    GLUCOSEU 100 10/06/2020       Radiology:  CT HIP LEFT WO CONTRAST   Final Result   1. No acute osseous abnormality.    2. Increased size and attenuation of the left psoas and iliacus muscles compatible with intramuscular hemorrhage. 3. Bilateral inguinal hernias containing loops of nonobstructed bowel. VL Extremity Venous Left   Final Result      CT HEAD WO CONTRAST   Final Result   No acute intracranial abnormality. CT CERVICAL SPINE WO CONTRAST   Final Result   No evidence for fracture or malalignment of the cervical spine. XR HIP 2-3 VW W PELVIS LEFT   Final Result   No acute fracture         XR CHEST PORTABLE   Final Result   No radiographic evidence of acute pulmonary disease. Active Hospital Problems    Diagnosis Date Noted    Iliopsoas muscle hematoma, left, initial encounter [S70.12XA] 10/07/2020    Anemia due to blood loss, acute [D62] 10/07/2020    Diabetes mellitus ([facility] [address] [E11.9]     Hyperlipidemia [E78.5]     Hypertension [I10]     Left hip pain [M25.552]      Assessment/Plan:  Left hip pain secondary to iliopsoas hematoma -unclear if there is any minimal trauma. -X-ray left pelvis and hip revealed no acute fracture  -CT left hip revealed no acute osseous abnormality. Increased size and attenuation of the left psoas and iliac  muscles compatible with intramuscular hemorrhage  -1 L NS bolus given in ED  -up with assistance  -pt/ot eval  -prn pain medication   -orthopedic surgery consulted in ED - appreciate recommendations in advance  -Vascular surgery consulted - recommended to hold Aspirin for now and manage conservatively .      Acute blood loss anemia on top of anemia due to chronic kidney disease  -  9.5/28.3 on admission  -pt with intramuscular hematoma left iliopsoas muscle.  -cbc every 8 hours and transfuse Hb for PRBC < 7      Acute cystitis without hematuria  -UA positive  WBC and 1+ bacteria  -ceftriaxone given in ED and continued 10/6/2020     Essential HTN  -continue atenolol and lisinopril     HLD  -continue lovastatin     DM2, uncontrolled  -  -hemglobin a1c in am  -hold home medications  -mdssi  -poct ac/hs  -hypoglycemia protocol  -carb control diet     CKD stage V, CR 3.6 and GFR 16 on admission  -baseline CR and GFR appears to be 1.8 / 32  -bmp in am  -nephrology consult - appreciate recommendations in advance     Elevated troponin, 0.03 on admission  -likely 2/2 demand ischemia d/t CKD  -no c/o chest pain  -tele monitoring  -trend troponin     DVT Prophylaxis: scd   Diet: DIET CARB CONTROL;  Code Status: DNR-CCA    PT/OT Eval Status: Ordered     Dispo -likely 1 to 2 days pending clinical improvement and stabilization of hemoglobin       The note was completed using Dragon -speech recognition software & EMR  . Every effort was made to ensure accuracy; however, inadvertent computerized transcription errors may be present.     Gloria Mesa MD

## 2020-10-08 LAB
ANION GAP SERPL CALCULATED.3IONS-SCNC: 11 MMOL/L (ref 3–16)
BUN BLDV-MCNC: 69 MG/DL (ref 7–20)
CALCIUM SERPL-MCNC: 8.5 MG/DL (ref 8.3–10.6)
CHLORIDE BLD-SCNC: 102 MMOL/L (ref 99–110)
CO2: 21 MMOL/L (ref 21–32)
CREAT SERPL-MCNC: 3.5 MG/DL (ref 0.8–1.3)
GFR AFRICAN AMERICAN: 20
GFR NON-AFRICAN AMERICAN: 16
GLUCOSE BLD-MCNC: 155 MG/DL (ref 70–99)
GLUCOSE BLD-MCNC: 156 MG/DL (ref 70–99)
GLUCOSE BLD-MCNC: 160 MG/DL (ref 70–99)
GLUCOSE BLD-MCNC: 163 MG/DL (ref 70–99)
GLUCOSE BLD-MCNC: 167 MG/DL (ref 70–99)
GLUCOSE BLD-MCNC: 167 MG/DL (ref 70–99)
HCT VFR BLD CALC: 22.9 % (ref 40.5–52.5)
HCT VFR BLD CALC: 23.1 % (ref 40.5–52.5)
HCT VFR BLD CALC: 23.6 % (ref 40.5–52.5)
HEMOGLOBIN: 7.7 G/DL (ref 13.5–17.5)
HEMOGLOBIN: 7.7 G/DL (ref 13.5–17.5)
HEMOGLOBIN: 7.9 G/DL (ref 13.5–17.5)
PERFORMED ON: ABNORMAL
POTASSIUM REFLEX MAGNESIUM: 4.4 MMOL/L (ref 3.5–5.1)
SODIUM BLD-SCNC: 134 MMOL/L (ref 136–145)

## 2020-10-08 PROCEDURE — 6370000000 HC RX 637 (ALT 250 FOR IP): Performed by: NURSE PRACTITIONER

## 2020-10-08 PROCEDURE — 85014 HEMATOCRIT: CPT

## 2020-10-08 PROCEDURE — G0378 HOSPITAL OBSERVATION PER HR: HCPCS

## 2020-10-08 PROCEDURE — 97110 THERAPEUTIC EXERCISES: CPT

## 2020-10-08 PROCEDURE — 6370000000 HC RX 637 (ALT 250 FOR IP): Performed by: INTERNAL MEDICINE

## 2020-10-08 PROCEDURE — 85018 HEMOGLOBIN: CPT

## 2020-10-08 PROCEDURE — 97530 THERAPEUTIC ACTIVITIES: CPT

## 2020-10-08 PROCEDURE — 2580000003 HC RX 258: Performed by: INTERNAL MEDICINE

## 2020-10-08 PROCEDURE — 2580000003 HC RX 258: Performed by: NURSE PRACTITIONER

## 2020-10-08 PROCEDURE — 1200000000 HC SEMI PRIVATE

## 2020-10-08 PROCEDURE — 97535 SELF CARE MNGMENT TRAINING: CPT

## 2020-10-08 PROCEDURE — 36415 COLL VENOUS BLD VENIPUNCTURE: CPT

## 2020-10-08 PROCEDURE — 6360000002 HC RX W HCPCS: Performed by: NURSE PRACTITIONER

## 2020-10-08 PROCEDURE — 80048 BASIC METABOLIC PNL TOTAL CA: CPT

## 2020-10-08 RX ADMIN — INSULIN LISPRO 2 UNITS: 100 INJECTION, SOLUTION INTRAVENOUS; SUBCUTANEOUS at 08:39

## 2020-10-08 RX ADMIN — ATENOLOL 25 MG: 25 TABLET ORAL at 08:31

## 2020-10-08 RX ADMIN — BRIMONIDINE TARTRATE 1 DROP: 2 SOLUTION OPHTHALMIC at 21:40

## 2020-10-08 RX ADMIN — BRIMONIDINE TARTRATE 1 DROP: 2 SOLUTION OPHTHALMIC at 05:34

## 2020-10-08 RX ADMIN — SODIUM BICARBONATE 650 MG TABLET 650 MG: at 13:58

## 2020-10-08 RX ADMIN — SODIUM CHLORIDE, PRESERVATIVE FREE 10 ML: 5 INJECTION INTRAVENOUS at 08:36

## 2020-10-08 RX ADMIN — ATROPINE SULFATE 1 DROP: 10 SOLUTION OPHTHALMIC at 21:40

## 2020-10-08 RX ADMIN — ATORVASTATIN CALCIUM 10 MG: 10 TABLET, FILM COATED ORAL at 08:31

## 2020-10-08 RX ADMIN — BRIMONIDINE TARTRATE 1 DROP: 2 SOLUTION OPHTHALMIC at 13:58

## 2020-10-08 RX ADMIN — SODIUM BICARBONATE 650 MG TABLET 650 MG: at 08:31

## 2020-10-08 RX ADMIN — SODIUM BICARBONATE 650 MG TABLET 650 MG: at 21:32

## 2020-10-08 RX ADMIN — INSULIN LISPRO 2 UNITS: 100 INJECTION, SOLUTION INTRAVENOUS; SUBCUTANEOUS at 12:31

## 2020-10-08 RX ADMIN — INSULIN LISPRO 2 UNITS: 100 INJECTION, SOLUTION INTRAVENOUS; SUBCUTANEOUS at 17:06

## 2020-10-08 RX ADMIN — TRAMADOL HYDROCHLORIDE 50 MG: 50 TABLET, FILM COATED ORAL at 07:10

## 2020-10-08 RX ADMIN — TIMOLOL MALEATE 1 DROP: 5 SOLUTION/ DROPS OPHTHALMIC at 21:39

## 2020-10-08 RX ADMIN — SODIUM CHLORIDE, PRESERVATIVE FREE 10 ML: 5 INJECTION INTRAVENOUS at 21:32

## 2020-10-08 RX ADMIN — CYCLOBENZAPRINE 10 MG: 10 TABLET, FILM COATED ORAL at 08:31

## 2020-10-08 RX ADMIN — ATROPINE SULFATE 1 DROP: 10 SOLUTION OPHTHALMIC at 08:35

## 2020-10-08 RX ADMIN — INSULIN LISPRO 1 UNITS: 100 INJECTION, SOLUTION INTRAVENOUS; SUBCUTANEOUS at 21:33

## 2020-10-08 RX ADMIN — TIMOLOL MALEATE 1 DROP: 5 SOLUTION/ DROPS OPHTHALMIC at 08:36

## 2020-10-08 RX ADMIN — INSULIN GLARGINE 10 UNITS: 100 INJECTION, SOLUTION SUBCUTANEOUS at 21:33

## 2020-10-08 RX ADMIN — CEFTRIAXONE SODIUM 1 G: 1 INJECTION, POWDER, FOR SOLUTION INTRAMUSCULAR; INTRAVENOUS at 21:32

## 2020-10-08 RX ADMIN — SODIUM CHLORIDE: 9 INJECTION, SOLUTION INTRAVENOUS at 07:13

## 2020-10-08 ASSESSMENT — PAIN SCALES - GENERAL
PAINLEVEL_OUTOF10: 0
PAINLEVEL_OUTOF10: 10
PAINLEVEL_OUTOF10: 0

## 2020-10-08 ASSESSMENT — PAIN DESCRIPTION - FREQUENCY
FREQUENCY: INTERMITTENT

## 2020-10-08 ASSESSMENT — PAIN DESCRIPTION - ORIENTATION
ORIENTATION: LEFT

## 2020-10-08 ASSESSMENT — PAIN DESCRIPTION - LOCATION
LOCATION: GROIN

## 2020-10-08 ASSESSMENT — PAIN DESCRIPTION - ONSET
ONSET: ON-GOING

## 2020-10-08 ASSESSMENT — PAIN DESCRIPTION - PAIN TYPE
TYPE: ACUTE PAIN

## 2020-10-08 ASSESSMENT — PAIN DESCRIPTION - DESCRIPTORS
DESCRIPTORS: SHARP

## 2020-10-08 NOTE — PROGRESS NOTES
Spoke with Bryan NP regarding 1x pt confusion. Pt was attempting to get out of bed and stated \"I don't know what's going on, I think my blood sugar is low,\" BS checked, and was 160. Pt had pulled PIV out accidentally. PIV replaced. VS around the same as earlier in the shift. /72. NP aware of BP. NP states to call if BP greater than 190. Will continue to monitor.

## 2020-10-08 NOTE — PROGRESS NOTES
(3/2012). Restrictions  Restrictions/Precautions  Restrictions/Precautions: General Precautions, Fall Risk  Position Activity Restriction  Other position/activity restrictions: up with assist     Subjective   General  Chart Reviewed: Yes  Patient assessed for rehabilitation services?: Yes  Family / Caregiver Present: No  Referring Practitioner: BRIAN Sheridan CNP  Diagnosis: CC: L hip pain, s/p fall; Probable Ilio psoas hematoma  Subjective  Subjective: Pt in bed on arrival, pleasant and agreeable to eval and treat  General Comment  Comments: Per RN okay to treat - orthosatic earlier this date     Pt c/o unspecified level of pain in LLE with movement. Pt satisfied with rest and repositioning after activity. Orientation  Orientation  Overall Orientation Status: Impaired  Orientation Level: Oriented to person;Disoriented to situation;Disoriented to time;Disoriented to place(Pt re-oriented by therapist, limited carryover)     Objective    ADL  Feeding: Setup; Beverage management(to drink sitting EOB)  LE Dressing: Minimal assistance(to don brief supine via bridging with assist for LLE)           Balance  Sitting Balance: Supervision  Standing Balance: Contact guard assistance(with RW)     Bed mobility  Supine to Sit: Minimal assistance(for LLE)  Sit to Supine: Minimal assistance(for LLE)  Scooting: Stand by assistance(to EOB)   Bridging: SBA    Transfers  Sit to stand: Contact guard assistance  Stand to sit: Contact guard assistance                          Cognition  Overall Cognitive Status: Exceptions  Following Commands:  Follows one step commands consistently  Attention Span: Attends with cues to redirect  Memory: Decreased short term memory;Decreased recall of recent events  Safety Judgement: Decreased awareness of need for assistance;Decreased awareness of need for safety  Problem Solving: Assistance required to generate solutions;Assistance required to identify errors made;Assistance required to implement solutions;Assistance required to correct errors made;Decreased awareness of errors  Insights: Decreased awareness of deficits  Initiation: Requires cues for some  Sequencing: Requires cues for some                                         Education: Role of OT, safe t/f training, safe use of DME, awareness of deficits, discharge planning, ADL as therapeutic exercise, importance of OOB, cognitive orientation     Plan   Plan  Times per week: 3-5x/week  Current Treatment Recommendations: Strengthening, Balance Training, Functional Mobility Training, Endurance Training, Safety Education & Training, Self-Care / ADL, Home Management Training, Patient/Caregiver Education & Training, Equipment Evaluation, Education, & procurement, Gait Training, Stair training, Positioning, Pain Management    Goals  Short term goals  Time Frame for Short term goals: 1 week (by 9/14/20)  Short term goal 1: Pt will complete functional transfers with Supervision or better  Short term goal 2: Pt will complete LE dressing with setup, use of AE as needed  Short term goal 3: Pt will tolerate 3 minutes dynamic standing activity with SBA in preparation for standing ADL/IADL by 10/10  Patient Goals   Patient goals : \"to go home\"       Therapy Time   Individual Concurrent Group Co-treatment   Time In 0949         Time Out 1013         Minutes 24         Timed Code Treatment Minutes: 24 Minutes       If patient is discharged prior to next treatment session, this note will serve as the discharge summary.   Toro Renae OTR/L #[id number]

## 2020-10-08 NOTE — PROGRESS NOTES
kidney disease stage 5  - Etiology: DM Nephropathy  - Data: Scr mid to high 3 range - stable at baseline  Follows with Dr. Maxx Horowitz in our office     Hypertension c/b + Orthostatics     Anemia of chronic disease - started on Epogen 5K [id number] as outpatient     Metabolic acidosis  - On oral sodium bicarbonate 650 mg po tid     Left hip pain/intramuscular hemorrhage  - Plans per Vascular surgery, note reviewed.   Holding ASA     UTI:  On Rocephin     Plan/     We can hold IV fluids  Based on exam and orthostatic, would hold lasix for now  Monitor BP, I suspect high risk for orthostatic drop and permissive supine hypertension is ok for now   -----------------------------  Gregory Yost M.D.   [facility]

## 2020-10-08 NOTE — DISCHARGE INSTR - COC
Patient Infection Status       Infection Onset Added Last Indicated Last Indicated By Review Planned Expiration Resolved Resolved By    C-diff Rule Out 10/07/20 10/07/20 10/07/20 Clostridium difficile toxin/antigen (Ordered)        Resolved    COVID-19 Rule Out 10/06/20 10/06/20 10/06/20 COVID-19 (Ordered)   10/06/20 Rule-Out Test Resulted            Nurse Assessment:  Last Vital Signs: BP (!) 178/83   Pulse 65   Temp 97.5 °F (36.4 °C) (Oral)   Resp 18   Ht 6' 4\" (1.93 m)   Wt 174 lb (78.9 kg)   SpO2 96%   BMI 21.18 kg/m²     Last documented pain score (0-10 scale): Pain Level: 0  Last Weight:   Wt Readings from Last 1 Encounters:   10/06/20 174 lb (78.9 kg)     Mental Status:  {IP PT MENTAL STATUS:}    IV Access:  { HECTOR IV ACCESS:896561039}    Nursing Mobility/ADLs:  Walking   {[facility] DME NRHN:613938989}  Transfer  {[facility] DME WGVX:741381833}  Bathing  {[facility] DME UUYA:617159835}  Dressing  {[facility] DME EBGB:171937302}  Toileting  {[facility] DME IDMJ:414443088}  Feeding  {[facility] DME KKXT:604442288}  Med Admin  {[facility] DME THWE:547340878}  Med Delivery   { HECTOR MED Delivery:354545072}    Wound Care Documentation and Therapy:        Elimination:  Continence: Bowel: {YES / EK:11644}  Bladder: {YES / Z}  Urinary Catheter: {Urinary Catheter:084020813}   Colostomy/Ileostomy/Ileal Conduit: {YES / JT:10461}       Date of Last BM: ***    Intake/Output Summary (Last 24 hours) at 10/8/2020 1327  Last data filed at 10/7/2020 1728  Gross per 24 hour   Intake --   Output 550 ml   Net -550 ml     I/O last 3 completed shifts:   In: 18 [P.O.:480]  Out: 550 [Urine:550]    Safety Concerns:     [address] [employer] Safety Concerns:230678746}    Impairments/Disabilities:      [address] [employer] Impairments/Disabilities:143649386}    Nutrition Therapy:  Current Nutrition Therapy:   [address] [facility] Diet List:593915785}    Routes of Feeding: {[facility] DME Other Feedings:587128196}  Liquids: {Slp liquid thickness:09129}  Daily Fluid Restriction: {[facility] DME Yes amt 09140  Phone: [phone]  Fax: [phone]      / signature: {Esignature:162070207}    PHYSICIAN SECTION    Prognosis: Fair    Condition at Discharge: Stable    Rehab Potential (if transferring to Rehab): Fair    Recommended Labs or Other Treatments After Discharge: F/up with PCP and Nephrology as instructed in 1-2 weeks   - Resume Aspirin from 22/62/96     Physician Certification: I certify the above information and transfer of Madie Shen  is necessary for the continuing treatment of the diagnosis listed and that he requires [address] for less 30 days.      Update Admission H&P: No change in H&P    PHYSICIAN SIGNATURE:  Electronically signed by Rayne Schwab, MD on 10/9/20 at 11:54 AM EDT

## 2020-10-08 NOTE — CARE COORDINATION
Case Management/Follow up:    Hospital day # 1 Unit: C-5  Diagnosis and current status as per MD progress:Iliopsoas muscle hematoma, left, initial encounter,  Vascular feels that will be resolved with medication changes   Anticipated d/c date: Likely dc 10/9/2020  Expected plan for discharge: Home with family to provide 24 hr support and [facility]  Potential barriers: Denies barriers   Comments:  IM reports Patient has improved and likely dc home tomorrow. Patient has 24 hr support and HHC and cane/ walker at home. No needs at this time will assist if recs change.  BOGDAN English

## 2020-10-08 NOTE — PROGRESS NOTES
Hospitalist Progress Note      PCP: Hilton Delarosa MD    Date of Admission: 10/6/2020    Chief Complaint   Patient presents with    Hip Pain     left hip pain  pulling jeans on    Loss of Consciousness      injured left elbow        Hospital Course: Received call from Dr. Tori Doll (vascular) and discussed plan of care. Patient has intramuscular hematoma possible from recent minor trauma. Recommended to hold aspirin for now. Orthopedics evaluation, recommended vascular on board for further eval. CT left hip consistent with iliopsoas hemorrhage. We will continue to manage conservatively, and monitor H&H. Subjective: Patient is comfortable. Denies pain. States he did well today with PT, and did not have much pain during therapy, but is still unsteady on his feet. No acute overnight events. Denies CP, SOB, N/V, fever, chills.     Medications:  Reviewed    Infusion Medications    dextrose       Scheduled Medications    atropine  1 drop Left Eye BID    brimonidine  1 drop Right Eye 3 times per day    prednisoLONE acetate  1 drop Left Eye Daily    timolol  1 drop Right Eye BID    erythromycin   Left Eye Nightly    Netarsudil-Latanoprost  1 drop Right Eye Nightly    sodium bicarbonate  650 mg Oral TID    insulin glargine  10 Units Subcutaneous Nightly    timolol  1 drop Right Eye BID    [Held by provider] aspirin  81 mg Oral Daily    atenolol  25 mg Oral Daily    atorvastatin  10 mg Oral Daily    insulin lispro  0-12 Units Subcutaneous TID WC    insulin lispro  0-6 Units Subcutaneous Nightly    sodium chloride flush  10 mL Intravenous 2 times per day    cefTRIAXone (ROCEPHIN) IV  1 g Intravenous Q24H     PRN Meds: cyclobenzaprine, glucose, dextrose, glucagon (rDNA), dextrose, sodium chloride flush, acetaminophen **OR** acetaminophen, polyethylene glycol, promethazine **OR** ondansetron, traMADol      Intake/Output Summary (Last 24 hours) at 10/8/2020 1317  Last data filed at 10/7/2020 TROPONINI 0.03* 0.03* 0.03*       Urinalysis:   Lab Results   Component Value Date    NITRU Negative 10/06/2020    WBCUA  10/06/2020    BACTERIA 1+ 10/06/2020    RBCUA 0-2 10/06/2020    BLOODU Negative 10/06/2020    SPECGRAV 1.020 10/06/2020    GLUCOSEU 100 10/06/2020       Radiology:  CT HIP LEFT WO CONTRAST   Final Result   1. No acute osseous abnormality. 2. Increased size and attenuation of the left psoas and iliacus muscles   compatible with intramuscular hemorrhage. 3. Bilateral inguinal hernias containing loops of nonobstructed bowel. VL Extremity Venous Left   Final Result      CT HEAD WO CONTRAST   Final Result   No acute intracranial abnormality. CT CERVICAL SPINE WO CONTRAST   Final Result   No evidence for fracture or malalignment of the cervical spine. XR HIP 2-3 VW W PELVIS LEFT   Final Result   No acute fracture         XR CHEST PORTABLE   Final Result   No radiographic evidence of acute pulmonary disease. Active Hospital Problems    Diagnosis Date Noted    Iliopsoas muscle hematoma, left, initial encounter [S70.12XA] 10/07/2020    Anemia due to blood loss, acute [D62] 10/07/2020    Diabetes mellitus ([facility] [address] [E11.9]     Hyperlipidemia [E78.5]     Hypertension [I10]     Left hip pain [M25.552]      Assessment/Plan:  1. Left hip pain secondary to iliopsoas hematoma - minimal trauma. -X-ray left pelvis and hip revealed no acute fracture  -CT left hip revealed no acute osseous abnormality.  Increased size and attenuation of the left psoas and iliac muscles compatible with intramuscular hemorrhage  -1 L NS bolus given in ED  - Up with assistance  - PT/OT eval  - Prn pain medication   - Orthopedic surgery consulted - recommended vascular consult  -Vascular surgery consulted - recommended to hold Aspirin for now and manage conservatively      2.  Acute blood loss anemia on top of anemia due to chronic kidney disease  -  9.5/28.3 on admission  - Pt with intramuscular hematoma left iliopsoas muscle. - Cbc every 8 hours and transfuse Hb for PRBC < 7  - Hgb 7.9 today      3. Acute cystitis without hematuria  - UA positive  WBC and 1+ bacteria  - Ceftriaxone given in ED and continued 10/6/2020     4. Essential HTN  - Continue atenolol and lisinopril     5. HLD  - Continue lovastatin     6. DM2, uncontrolled  - BC 365  - Hemglobin a1c 6.5%  - Hold home medications  - Mdssi  - Poct ac/hs  - Hypoglycemia protocol  - Carb control diet     7. CKD stage V, CR 3.6 and GFR 16 on admission  - Baseline CR and GFR appears to be 1.8 / 32  - Cr 3.5/16 today  - Nephrology on board     8. Elevated troponin, 0.03 on admission  - Likely 2/2 demand ischemia d/t CKD  - No c/o chest pain  - Tele monitoring  - Trend troponin    DVT Prophylaxis: SCD  Diet: DIET CARB CONTROL;  Code Status: DNR-CCA    PT/OT Eval Status: ordered    Dispo - likely tomorrow, to home with home care services    Patient case seen and discussed under supervision of preceptor, Dr. Shad Chen. Note made by PA-S2 student in the status of a scribe, with review by preceptor. Kim Gonzalez PA-S2      Addendum to PA student progress note:  Pt seen, examined, and evaluated with PA student, who acted as my scribe for the above documentation. I have reviewed the current history, physical findings, labs, assessment, and plan and agree with the note as documented. Ashley Aldridge MD  Hospitalist Physician       The note was completed using EMR. Every effort was made to ensure accuracy; However, inadvertent computerized transcription errors may be present.

## 2020-10-08 NOTE — PROGRESS NOTES
Physical Therapy  Facility/Department: [facility] C5 - MED SURG/ORTHO  Daily Treatment Note  NAME: Jr Prieto  : 10/17/1927  MRN: [id number]    Date of Service: 10/8/2020    Discharge Recommendations:  24 hour supervision or assist, Home with Home health PT   PT Equipment Recommendations  Equipment Needed: No    Assessment   Body structures, Functions, Activity limitations: Decreased functional mobility ; Decreased ROM; Decreased strength; Increased pain  Assessment: Pt currently experiencing symptomatic orthostatic hypotension, unable to ambulate this date for this reason. Pt required CGA for bed mobility, CGA for sit<>stand . Pt would benefit from skilled acute PT to address deficits and to assess and progress ambulation when BP stabilizes. Recommend home with 24 hr sup/assist and HHPT at [place] from acute setting  Treatment Diagnosis: decreased balance  Prognosis: Good  Decision Making: Medium Complexity  Patient Education: Pt verbalized understanding of importance of mobility while in acute setting  Barriers to Learning: no  REQUIRES PT FOLLOW UP: Yes  Activity Tolerance  Activity Tolerance: Treatment limited secondary to medical complications (free text)  Activity Tolerance: PT with symptomatic orthostatic hypostension; BP as follows: supine 144/67seated /52; standing 92/50 with dizziness and a tried feeling; supine in bed 164/65     Patient Diagnosis(es): The primary encounter diagnosis was Syncope and collapse. Diagnoses of Orthostatic hypotension and Intramuscular hematoma were also pertinent to this visit. has a past medical history of Aortic aneurysm ([address] Arthritis, Cancer ([address] CKD (chronic kidney disease) stage 5, GFR less than 15 ml/min ([address] Diabetes mellitus ([address] Hyperlipidemia, Hypertension, Urinary problem, and Wears glasses.    has a past surgical history that includes TURP; Lung biopsy; other surgical history; and Cataract removal with implant (3/2012). Restrictions  Restrictions/Precautions  Restrictions/Precautions: General Precautions, Fall Risk  Position Activity Restriction  Other position/activity restrictions: up with assist  Subjective   General  Chart Reviewed: Yes  Response To Previous Treatment: Patient with no complaints from previous session.   Family / Caregiver Present: Natacha Jones)  Referring Practitioner: Jenna Valdes CNP  Subjective  Subjective: Pt agreeable to PT  General Comment  Comments: Pt supine in bed upon entry with son in room, RN cleared pt for therapy  Pain Screening  Patient Currently in Pain: No(states he recently had pain med)  Vital Signs  Patient Currently in Pain: No(states he recently had pain med)       Orientation  Orientation  Overall Orientation Status: Impaired  Orientation Level: Disoriented to place;Oriented to person  Cognition      Objective   Bed mobility  Supine to Sit: Contact guard assistance  Sit to Supine: Minimal assistance  Transfers  Sit to Stand: Contact guard assistance  Stand to sit: Contact guard assistance  Bed to Chair: (held d/t hypostasis)  Comment: See activity ingrid for BPs            AM-PAC Score  AM-PAC Inpatient Mobility Raw Score : 17 (10/08/20 1446)  AM-PAC Inpatient T-Scale Score : 42.13 (10/08/20 1446)  Mobility Inpatient CMS 0-100% Score: 50.57 (10/08/20 1446)  Mobility Inpatient CMS G-Code Modifier : CK (10/08/20 1446)          Goals  Short term goals  Time Frame for Short term goals: 10/11/20 unless noted  Short term goal 1: Pt will perform bed mobility with supervision by 10/10/20 -10/08 CGA  Short term goal 2: Pt will perform transfers with SBA -10/08 CGA  Short term goal 3: Pt will ambulate 50 ft with LRAD and SBA -10/08 N/T  Short term goal 4: Pt will negotiate 4 stairs with LRAD and CGA -10/08 N/T  Patient Goals   Patient goals : \"to go home\"    Plan    Plan  Times per week: 3-5  Current Treatment Recommendations: Strengthening, Neuromuscular Re-education, Home Exercise Program,

## 2020-10-08 NOTE — CARE COORDINATION
[facility]    Referral received from  to follow for home care services. I will follow for needs, and speak with patient to verify demos.     Amelia Munoz RN, BSN CTN  [facility] [phone]

## 2020-10-09 VITALS
DIASTOLIC BLOOD PRESSURE: 68 MMHG | HEART RATE: 60 BPM | RESPIRATION RATE: 18 BRPM | BODY MASS INDEX: 21.19 KG/M2 | SYSTOLIC BLOOD PRESSURE: 140 MMHG | WEIGHT: 174 LBS | TEMPERATURE: 97.6 F | HEIGHT: 76 IN | OXYGEN SATURATION: 98 %

## 2020-10-09 LAB
ALBUMIN SERPL-MCNC: 2.9 G/DL (ref 3.4–5)
ANION GAP SERPL CALCULATED.3IONS-SCNC: 12 MMOL/L (ref 3–16)
BUN BLDV-MCNC: 69 MG/DL (ref 7–20)
CALCIUM SERPL-MCNC: 8.4 MG/DL (ref 8.3–10.6)
CHLORIDE BLD-SCNC: 104 MMOL/L (ref 99–110)
CO2: 20 MMOL/L (ref 21–32)
CREAT SERPL-MCNC: 3.7 MG/DL (ref 0.8–1.3)
GFR AFRICAN AMERICAN: 19
GFR NON-AFRICAN AMERICAN: 15
GLUCOSE BLD-MCNC: 194 MG/DL (ref 70–99)
GLUCOSE BLD-MCNC: 94 MG/DL (ref 70–99)
GLUCOSE BLD-MCNC: 99 MG/DL (ref 70–99)
HCT VFR BLD CALC: 22.1 % (ref 40.5–52.5)
HEMOGLOBIN: 7.5 G/DL (ref 13.5–17.5)
ORGANISM: ABNORMAL
PERFORMED ON: ABNORMAL
PERFORMED ON: NORMAL
PHOSPHORUS: 5.1 MG/DL (ref 2.5–4.9)
POTASSIUM REFLEX MAGNESIUM: 4.2 MMOL/L (ref 3.5–5.1)
POTASSIUM SERPL-SCNC: 4.2 MMOL/L (ref 3.5–5.1)
SODIUM BLD-SCNC: 136 MMOL/L (ref 136–145)
URINE CULTURE, ROUTINE: ABNORMAL

## 2020-10-09 PROCEDURE — 2580000003 HC RX 258: Performed by: NURSE PRACTITIONER

## 2020-10-09 PROCEDURE — 6370000000 HC RX 637 (ALT 250 FOR IP): Performed by: NURSE PRACTITIONER

## 2020-10-09 PROCEDURE — 97110 THERAPEUTIC EXERCISES: CPT

## 2020-10-09 PROCEDURE — 97530 THERAPEUTIC ACTIVITIES: CPT

## 2020-10-09 PROCEDURE — 85018 HEMOGLOBIN: CPT

## 2020-10-09 PROCEDURE — 85014 HEMATOCRIT: CPT

## 2020-10-09 PROCEDURE — 36415 COLL VENOUS BLD VENIPUNCTURE: CPT

## 2020-10-09 PROCEDURE — 6370000000 HC RX 637 (ALT 250 FOR IP): Performed by: INTERNAL MEDICINE

## 2020-10-09 PROCEDURE — 80069 RENAL FUNCTION PANEL: CPT

## 2020-10-09 RX ORDER — ASPIRIN 81 MG/1
81 TABLET ORAL DAILY
Qty: 30 TABLET | Refills: 0 | Status: SHIPPED | OUTPATIENT
Start: 2020-10-14

## 2020-10-09 RX ADMIN — SODIUM CHLORIDE, PRESERVATIVE FREE 10 ML: 5 INJECTION INTRAVENOUS at 08:26

## 2020-10-09 RX ADMIN — ATENOLOL 25 MG: 25 TABLET ORAL at 08:26

## 2020-10-09 RX ADMIN — ATORVASTATIN CALCIUM 10 MG: 10 TABLET, FILM COATED ORAL at 08:26

## 2020-10-09 RX ADMIN — PREDNISOLONE ACETATE 1 DROP: 10 SUSPENSION/ DROPS OPHTHALMIC at 08:29

## 2020-10-09 RX ADMIN — SODIUM BICARBONATE 650 MG TABLET 650 MG: at 08:26

## 2020-10-09 RX ADMIN — INSULIN LISPRO 2 UNITS: 100 INJECTION, SOLUTION INTRAVENOUS; SUBCUTANEOUS at 12:13

## 2020-10-09 RX ADMIN — ATROPINE SULFATE 1 DROP: 10 SOLUTION OPHTHALMIC at 08:29

## 2020-10-09 RX ADMIN — TIMOLOL MALEATE 1 DROP: 5 SOLUTION/ DROPS OPHTHALMIC at 08:29

## 2020-10-09 RX ADMIN — BRIMONIDINE TARTRATE 1 DROP: 2 SOLUTION OPHTHALMIC at 05:26

## 2020-10-09 ASSESSMENT — PAIN SCALES - WONG BAKER
WONGBAKER_NUMERICALRESPONSE: 4
WONGBAKER_NUMERICALRESPONSE: 4

## 2020-10-09 ASSESSMENT — PAIN DESCRIPTION - ORIENTATION: ORIENTATION: LEFT

## 2020-10-09 ASSESSMENT — PAIN SCALES - GENERAL
PAINLEVEL_OUTOF10: 0

## 2020-10-09 ASSESSMENT — PAIN DESCRIPTION - LOCATION: LOCATION: LEG

## 2020-10-09 NOTE — CARE COORDINATION
[facility]    DC order noted, all docs needed have been faxed to [facility] for home care services.     Home care to see patient within 24-48 hrs    Gracia Amezquita RN, BSN CTN  [facility] [phone]

## 2020-10-09 NOTE — DISCHARGE SUMMARY
Rales/Wheezes/Rhonchi. Cardiovascular:  Regular rate and rhythm with normal S1/S2 without murmurs, rubs or gallops. Abdomen: Soft, non-tender, non-distended with normal bowel sounds. Musculoskeletal:  No clubbing, cyanosis or edema bilaterally. Full range of motion without deformity. Skin: Skin color, texture, turgor normal.  No rashes or lesions. Neurologic:  Neurovascularly intact without any focal sensory/motor deficits. Cranial nerves: II-XII intact, grossly non-focal.  Psychiatric:  Alert and oriented, thought content appropriate, normal insight  Capillary Refill: Brisk,< 3 seconds   Peripheral Pulses: +2 palpable, equal bilaterally       Labs: For convenience and continuity at follow-up the following most recent labs are provided:    CBC:    Lab Results   Component Value Date    WBC 7.7 10/07/2020    HGB 7.5 10/09/2020    HCT 22.1 10/09/2020     10/07/2020       Renal:    Lab Results   Component Value Date     10/09/2020    K 4.2 10/09/2020    K 4.2 10/09/2020     10/09/2020    CO2 20 10/09/2020    BUN 69 10/09/2020    CREATININE 3.7 10/09/2020    CALCIUM 8.4 10/09/2020    PHOS 5.1 10/09/2020       Significant Diagnostic Studies    Radiology:   CT HIP LEFT WO CONTRAST   Final Result   1. No acute osseous abnormality. 2. Increased size and attenuation of the left psoas and iliacus muscles   compatible with intramuscular hemorrhage. 3. Bilateral inguinal hernias containing loops of nonobstructed bowel. VL Extremity Venous Left   Final Result      CT HEAD WO CONTRAST   Final Result   No acute intracranial abnormality. CT CERVICAL SPINE WO CONTRAST   Final Result   No evidence for fracture or malalignment of the cervical spine. XR HIP 2-3 VW W PELVIS LEFT   Final Result   No acute fracture         XR CHEST PORTABLE   Final Result   No radiographic evidence of acute pulmonary disease.            Consults:   IP CONSULT TO HOSPITALIST  IP CONSULT TO ORTHOPEDIC SURGERY  IP CONSULT TO NEPHROLOGY  IP CONSULT TO VASCULAR SURGERY  IP CONSULT TO HOME CARE NEEDS    Disposition:  Home with home health services    Condition at Discharge: Stable    Discharge Instructions/Follow-up:    - Home care will see patent within 48 hours, 24 hour supervision or assist  - Home health PT to continue rehab  - PRN pain meds  - F/u with PCP in 1-2 weeks for repeat labs to check Hgb and kidney function  - Continue at home medications      Code Status:  Prior     Activity: activity as tolerated    Diet: carb control      Discharge Medications:   Discharge Medication List as of 10/9/2020  1:36 PM           Details   aspirin 81 MG EC tablet Take 1 tablet by mouth daily, Disp-30 tablet,R-0Normal              Details   insulin glargine (LANTUS SOLOSTAR) 100 UNIT/ML injection pen Inject 10 Units into the skin nightlyHistorical Med      ferrous sulfate (IRON 325) 325 (65 Fe) MG tablet Take 325 mg by mouth dailyHistorical Med      Cholecalciferol 50 MCG (2000 UT) CAPS Take 1 capsule by mouth dailyHistorical Med      brimonidine (ALPHAGAN) 0.2 % ophthalmic solution Place 1 drop into the left eye 3 times dailyHistorical Med      timolol (TIMOPTIC) 0.5 % ophthalmic solution Place 1 drop into the right eye 2 times dailyHistorical Med      erythromycin (ROMYCIN) 5 MG/GM ophthalmic ointment Place 1 cm into the right eye nightly Lower eyelid, Right Eye, NIGHTLY Starting Tue 12/17/2019, Historical Med      SITagliptin (JANUVIA) 25 MG tablet Take 25 mg by mouth dailyHistorical Med      sodium bicarbonate 325 MG tablet Take 650 mg by mouth 3 times dailyHistorical Med      Netarsudil-Latanoprost (ROCKLATAN) 0.02-0.005 % SOLN Apply 1 drop to eye dailyHistorical Med      ondansetron (ZOFRAN) 4 MG tablet Take 1 tablet by mouth every 8 hours as needed for Nausea, Disp-10 tablet, R-0Print      lovastatin (MEVACOR) 20 MG tablet Take 20 mg by mouth daily.         Dorzolamide HCl-Timolol Mal (COSOPT OP) Apply 1 drop to eye 2 times daily. Both eyes       prednisoLONE acetate (PRED FORTE) 1 % ophthalmic suspension Place 1 drop into the left eye 2 times daily. atropine 1 % ophthalmic solution Place 1 drop into the left eye 2 times daily. Time Spent on discharge is more than 30 minutes in the examination, evaluation, counseling and review of medications and discharge plan. Patient case seen and discussed under supervision of preceptor, Damion Edwards. Note made by JAIRON student in the status of a scribe, with review by preceptor. Fredy SHUKLA-S2     Addendum to PA cathy discharge summary note:  Pt seen, examined, and evaluated with GAYLA morgan, who acted as my scribe for the above documentation. I have reviewed the current history, physical findings, labs, assessment, and plan and agree with the note as documented. Salo Goddard MD  Hospitalist Physician       The note was completed using EMR. Every effort was made to ensure accuracy; However, inadvertent computerized transcription errors may be present. Thank you Wing Kelly MD for the opportunity to be involved in this patient's care. If you have any questions or concerns please feel free to contact me at [phone].

## 2020-10-09 NOTE — PROGRESS NOTES
Physical Therapy  Facility/Department: [facility] C5 - MED SURG/ORTHO  Daily Treatment Note  NAME: Carlos Disla  : 10/17/1927  MRN: [id number]    Date of Service: 10/9/2020    Discharge Recommendations:  Continue to assess pending progress, 24 hour supervision or assist, Home with Home health PT   PT Equipment Recommendations  Equipment Needed: No    Assessment   Body structures, Functions, Activity limitations: Decreased functional mobility ; Decreased ROM; Decreased strength; Increased pain  Assessment: Pt requiring min A of 2 to complete functional transfers with use of RW due to posterior lean and mild unsteadiness. .  Pt would benefit from skilled acute PT to address deficits and to assess and progress ambulation when able. Cont. PT per POC  Treatment Diagnosis: decreased balance  Prognosis: Good  Decision Making: Medium Complexity  Barriers to Learning: no  REQUIRES PT FOLLOW UP: Yes  Activity Tolerance  Activity Tolerance: Patient Tolerated treatment well  Activity Tolerance: Supine: 163/66 and HR 61; Sitting 127/58 and HR 60; Standin/38 and HR 66; after seated rest in chair pt /57 and HR 61. Pt asymptomatic. Patient Diagnosis(es): The primary encounter diagnosis was Syncope and collapse. Diagnoses of Orthostatic hypotension, Intramuscular hematoma, Chronic kidney disease (CKD), stage V ([address] and Anemia due to blood loss, acute were also pertinent to this visit. has a past medical history of Aortic aneurysm ([address] Arthritis, Cancer ([address] CKD (chronic kidney disease) stage 5, GFR less than 15 ml/min ([address] Diabetes mellitus ([address] Hyperlipidemia, Hypertension, Urinary problem, and Wears glasses. has a past surgical history that includes TURP; Lung biopsy; other surgical history; and Cataract removal with implant (3/2012).     Restrictions  Restrictions/Precautions  Restrictions/Precautions: General Precautions, Fall Risk  Position Activity Restriction  Other position/activity restrictions: up with assist  Subjective   General  Chart Reviewed: Yes  Response To Previous Treatment: Patient with no complaints from previous session. Family / Caregiver Present: Yes(son)  Referring Practitioner: Jayshree Rodríguez CNP  Subjective  Subjective: Pt agreeable to PT  General Comment  Comments: Pt supine in bed upon entry with son in room, RN cleared pt for therapy  Pain Screening  Patient Currently in Pain: Yes  Pain Assessment  Ramos-Ruth Pain Rating: Hurts little more  Vital Signs  Patient Currently in Pain: Yes       Orientation  Orientation  Overall Orientation Status: Impaired  Orientation Level: Disoriented to place;Oriented to person;Disoriented to time  Cognition      Objective   Bed mobility  Supine to Sit: Contact guard assistance  Transfers  Sit to Stand: 2 Person Assistance;Minimal Assistance  Stand to sit: 2 Person Assistance;Minimal Assistance  Bed to Chair: 2 Person Assistance; Moderate assistance(rw)  Ambulation  Ambulation?: No  WB Status: no restrictions     Balance  Comments: Sitting Balance: Supervision  Standing Balance: Dependent/Total(min A of 2 with SW, posterior lean)  Exercises  Quad Sets: x 10 B  Heelslides: x 10 B  Gluteal Sets: x 10 B  Hip Flexion: x 10 B  Hip Abduction: clamshells sitting x 10 B  Knee Long Arc Quad: x 15 B  Ankle Pumps: x 20 B         Comment: sit<> stands x 3 from EOB using rw cga assist with cues for technique         AM-PAC Score  AM-PAC Inpatient Mobility Raw Score : 13 (10/09/20 1321)  AM-PAC Inpatient T-Scale Score : 36.74 (10/09/20 1321)  Mobility Inpatient CMS 0-100% Score: 64.91 (10/09/20 1321)  Mobility Inpatient CMS G-Code Modifier : CL (10/09/20 1321)          Goals  Short term goals  Time Frame for Short term goals: 10/11/20 unless noted  Short term goal 1: Pt will perform bed mobility with supervision by 10/10/20 -10/09 min A  Short term goal 2: Pt will perform transfers with SBA -10/09 min x 2 STS and bed> chair  Short term goal 3: Pt will ambulate 50 ft with LRAD

## 2020-10-09 NOTE — PROGRESS NOTES
Occupational Therapy  Facility/Department: [facility] C5 - MED SURG/ORTHO  Daily Treatment Note  NAME: Caitlyn Patton  : 10/17/1927  MRN: [id number]    Date of Service: 10/9/2020    Discharge Recommendations:  24 hour supervision or assist with home OT, Continue to assess pending progress     Assessment   Performance deficits / Impairments: Decreased functional mobility ; Decreased endurance;Decreased ADL status; Decreased balance;Decreased high-level IADLs;Decreased cognition;Decreased safe awareness;Decreased strength  Assessment: Pt able to tolerate OOB this date, requiring min A of 2 to complete functional transfers with use of RW due to posterior lean and mild unsteadiness. Pt declined participation/need for ADLs this session, tolerated BUE ther ex seated with rest breaks. Pt would benefit from continued skilled OT, continue per POC. Prognosis: Good  OT Education: OT Role;Plan of Care;Precautions;Transfer Training;Equipment;Orientation  Barriers to Learning: cognition  REQUIRES OT FOLLOW UP: Yes  Activity Tolerance  Activity Tolerance: Patient Tolerated treatment well  Activity Tolerance: Supine: 163/66 and HR 61; Sitting 127/58 and HR 60; Standin/38 and HR 66; after seated rest in chair pt /57 and HR 61. Pt asymptomatic. Safety Devices  Safety Devices in place: Yes  Type of devices: Left in chair;Call light within reach; Chair alarm in place;Gait belt;Nurse notified         Patient Diagnosis(es): The primary encounter diagnosis was Syncope and collapse. Diagnoses of Orthostatic hypotension, Intramuscular hematoma, Chronic kidney disease (CKD), stage V ([address] and Anemia due to blood loss, acute were also pertinent to this visit. has a past medical history of Aortic aneurysm ([address] Arthritis, Cancer ([address] CKD (chronic kidney disease) stage 5, GFR less than 15 ml/min ([address] Diabetes mellitus ([address] Hyperlipidemia, Hypertension, Urinary problem, and Wears glasses.    has a past surgical history that includes TURP; Lung biopsy; other surgical history; and Cataract removal with implant (3/2012). Restrictions  Restrictions/Precautions  Restrictions/Precautions: General Precautions, Fall Risk  Position Activity Restriction  Other position/activity restrictions: up with assist     Subjective   General  Chart Reviewed: Yes  Patient assessed for rehabilitation services?: Yes  Response to previous treatment: Patient with no complaints from previous session  Family / Caregiver Present: Yes  Referring Practitioner: BRIAN Blount CNP  Diagnosis: CC: L hip pain, s/p fall; Probable Ilio psoas hematoma    Subjective  Subjective: Pt resting in bed, pleasant and agreeable to OT treatment. Pain Assessment  Pain Assessment: Faces  Ramos-Baker Pain Rating: Hurts little more  Pain Location: Leg  Pain Orientation: Left  Non-Pharmaceutical Pain Intervention(s): Repositioned  Pre Treatment Pain Screening  Intervention List: Patient able to continue with treatment  Vital Signs  Patient Currently in Pain: Yes     Orientation  Orientation  Orientation Level: Oriented to person;Oriented to place     Objective    ADL  Additional Comments: Pt declined ADLs. Balance  Sitting Balance: Supervision  Standing Balance: Dependent/Total(min A of 2 with SW, posterior lean)  Standing Balance  Activity: 1st trial static stand at EOB to obtain BP, pt returned to EOB for rest, pt then completed bed to chair stand step transfer with min A of 2 and RW  Comment: pt with posterior lean    Bed mobility  Supine to Sit: Contact guard assistance(to R with HOB elevated and increased time, verbal cues for technique)     Transfers  Sit to stand: Dependent/Total;2 Person assistance(min A of 2)  Stand to sit: Dependent/Total;2 Person assistance(min A of 2)     Cognition  Overall Cognitive Status: Exceptions  Following Commands:  Follows one step commands with repetition  Attention Span: Attends with cues to redirect  Memory: Decreased short term memory;Decreased recall of recent events  Safety Judgement: Decreased awareness of need for assistance;Decreased awareness of need for safety  Problem Solving: Assistance required to generate solutions;Assistance required to identify errors made;Assistance required to implement solutions;Assistance required to correct errors made;Decreased awareness of errors  Insights: Decreased awareness of deficits  Initiation: Requires cues for some  Sequencing: Requires cues for some     Type of ROM/Therapeutic Exercise  Type of ROM/Therapeutic Exercise: AROM  Comment: seated in chair  Exercises  Shoulder Flexion: 15x  Shoulder ABduction: 15x  Shoulder ADduction: 15x  Elbow Flexion: 15x  Elbow Extension: 15x  Grasp/Release: 20x     Plan   Plan  Times per week: 3-5x/week  Current Treatment Recommendations: Strengthening, Balance Training, Functional Mobility Training, Endurance Training, Safety Education & Training, Self-Care / ADL, Home Management Training, Patient/Caregiver Education & Training, Equipment Evaluation, Education, & procurement, Gait Training, Stair training, Positioning, Pain Management    AM-PAC Score  AM-[facility] Inpatient Daily Activity Raw Score: 17 (10/09/20 1253)  AM-PAC Inpatient ADL T-Scale Score : 37.26 (10/09/20 1253)  ADL Inpatient CMS 0-100% Score: 50.11 (10/09/20 1253)  ADL Inpatient CMS G-Code Modifier : CK (10/09/20 1253)    Goals  Short term goals  Time Frame for Short term goals: 1 week (by 9/14/20)  Short term goal 1: Pt will complete functional transfers with Supervision or better-- ongoing, pt requiring min A of 2 10/9/20  Short term goal 2: Pt will complete LE dressing with setup, use of AE as needed-- ongping, NT 10/9/20  Short term goal 3: Pt will tolerate 3 minutes dynamic standing activity with SBA in preparation for standing ADL/IADL by 10/10-- ongoing, pt tolerated ~ 1 min static stand 10/9/20  Patient Goals   Patient goals : \"to go home\"     Therapy Time   Individual Concurrent Group Co-treatment   Time In 1052         Time Out 1120         Minutes 28         Timed Code Treatment Minutes: [address], POWELL/L

## 2020-10-09 NOTE — PROGRESS NOTES
166* 156* 99   PHOS  --   --  5.1*   BUN 70* 69* 69*   CREATININE 3.5* 3.5* 3.7*   LABGLOM 16* 16* 15*   GFRAA 20* 20* 19*       Assessment/     Chronic kidney disease stage 5  - Etiology: DM Nephropathy  - Data: Scr mid to high 3 range - stable at baseline  Follows with Dr. Aurora Trevizo in our office     Hypertension c/b + Orthostatics     Anemia of chronic disease - started on Epogen 5K [id number] as outpatient     Metabolic acidosis  - On oral sodium bicarbonate 650 mg po tid     Left hip pain/intramuscular hemorrhage  - Plans per Vascular surgery, note reviewed.   Holding ASA     UTI:  On Rocephin     Plan/     -Normal saline bolus of 403 mL if systolic blood pressure less than 100  -Thigh-high stockings  -Discussed with patient and his son regarding orthostatic hypotension and avoiding it and not getting up suddenly  -Based on exam and orthostatic, would hold lasix for now  -Monitor BP, I suspect high risk for orthostatic drop and permissive supine hypertension is ok for now

## 2020-10-09 NOTE — PROGRESS NOTES
Perfect Serve sent to [facility] at 0516 \"Pt blood pressure is 189/72. Would you like to add BP med? \" Waiting for response.

## 2020-10-15 PROBLEM — D50.0 IRON DEFICIENCY ANEMIA SECONDARY TO BLOOD LOSS (CHRONIC): Status: ACTIVE | Noted: 2020-10-15

## 2020-11-03 ENCOUNTER — HOSPITAL ENCOUNTER (OUTPATIENT)
Dept: NURSING | Age: 85
Setting detail: INFUSION SERIES
Discharge: HOME | End: 2020-11-03
Payer: MEDICARE

## 2020-11-03 VITALS — TEMPERATURE: 97.2 F | BODY MASS INDEX: 24.38 KG/M2 | HEIGHT: 72 IN | RESPIRATION RATE: 16 BRPM | WEIGHT: 180 LBS

## 2020-11-03 LAB
BASOPHILS ABSOLUTE: 0.1 K/UL (ref 0–0.2)
BASOPHILS RELATIVE PERCENT: 1.3 %
EOSINOPHILS ABSOLUTE: 0.3 K/UL (ref 0–0.6)
EOSINOPHILS RELATIVE PERCENT: 3.3 %
FERRITIN: 165.9 NG/ML (ref 30–400)
FOLATE: 9.34 NG/ML (ref 4.78–24.2)
HCT VFR BLD CALC: 29.3 % (ref 40.5–52.5)
HEMOGLOBIN: 9.7 G/DL (ref 13.5–17.5)
IRON SATURATION: 29 % (ref 20–50)
IRON: 68 UG/DL (ref 59–158)
LYMPHOCYTES ABSOLUTE: 1.7 K/UL (ref 1–5.1)
LYMPHOCYTES RELATIVE PERCENT: 19.4 %
MCH RBC QN AUTO: 29.5 PG (ref 26–34)
MCHC RBC AUTO-ENTMCNC: 33 G/DL (ref 31–36)
MCV RBC AUTO: 89.3 FL (ref 80–100)
MONOCYTES ABSOLUTE: 0.8 K/UL (ref 0–1.3)
MONOCYTES RELATIVE PERCENT: 8.9 %
NEUTROPHILS ABSOLUTE: 5.8 K/UL (ref 1.7–7.7)
NEUTROPHILS RELATIVE PERCENT: 67.1 %
PDW BLD-RTO: 16.3 % (ref 12.4–15.4)
PLATELET # BLD: 147 K/UL (ref 135–450)
PMV BLD AUTO: 8.2 FL (ref 5–10.5)
RBC # BLD: 3.28 M/UL (ref 4.2–5.9)
TOTAL IRON BINDING CAPACITY: 232 UG/DL (ref 260–445)
VITAMIN B-12: 458 PG/ML (ref 211–911)
WBC # BLD: 8.6 K/UL (ref 4–11)

## 2020-11-03 PROCEDURE — 85025 COMPLETE CBC W/AUTO DIFF WBC: CPT

## 2020-11-03 PROCEDURE — 83550 IRON BINDING TEST: CPT

## 2020-11-03 PROCEDURE — 83540 ASSAY OF IRON: CPT

## 2020-11-03 PROCEDURE — 99201 HC NEW PT, OUTPT VISIT LEVEL 1: CPT

## 2020-11-03 PROCEDURE — 36415 COLL VENOUS BLD VENIPUNCTURE: CPT

## 2020-11-03 PROCEDURE — 82728 ASSAY OF FERRITIN: CPT

## 2020-11-03 PROCEDURE — 82607 VITAMIN B-12: CPT

## 2020-11-03 PROCEDURE — 82746 ASSAY OF FOLIC ACID SERUM: CPT

## 2020-11-03 ASSESSMENT — PAIN - FUNCTIONAL ASSESSMENT: PAIN_FUNCTIONAL_ASSESSMENT: 0-10

## 2020-11-03 ASSESSMENT — PAIN DESCRIPTION - DESCRIPTORS: DESCRIPTORS: ACHING

## 2020-11-03 NOTE — PROGRESS NOTES
B/P 190/96 3 pressures where taken and this was lowest. Orders are to hold Aranesp if >922 systolic. Spoke with [facility] and made them aware. Patient had been taken off all B/P meds while in hospital. [place] scheduled an appointment for patient to see Dr Mary Lou Rod tomorrow @ 0 in [facility] office. Patient's son made aware. Rescheduled patient's appointment here next week.

## 2020-11-10 ENCOUNTER — HOSPITAL ENCOUNTER (OUTPATIENT)
Dept: NURSING | Age: 85
Setting detail: INFUSION SERIES
Discharge: HOME | End: 2020-11-10
Payer: MEDICARE

## 2020-11-10 VITALS
SYSTOLIC BLOOD PRESSURE: 173 MMHG | WEIGHT: 182 LBS | HEART RATE: 81 BPM | TEMPERATURE: 96.5 F | BODY MASS INDEX: 22.16 KG/M2 | DIASTOLIC BLOOD PRESSURE: 81 MMHG | RESPIRATION RATE: 16 BRPM | HEIGHT: 76 IN

## 2020-11-10 DIAGNOSIS — D50.0 IRON DEFICIENCY ANEMIA SECONDARY TO BLOOD LOSS (CHRONIC): Primary | ICD-10-CM

## 2020-11-10 PROCEDURE — 6360000002 HC RX W HCPCS: Performed by: INTERNAL MEDICINE

## 2020-11-10 PROCEDURE — 96372 THER/PROPH/DIAG INJ SC/IM: CPT

## 2020-11-10 RX ORDER — FUROSEMIDE 40 MG/1
40 TABLET ORAL SEE ADMIN INSTRUCTIONS
COMMUNITY

## 2020-11-10 RX ADMIN — DARBEPOETIN ALFA 40 MCG: 40 INJECTION, SOLUTION INTRAVENOUS; SUBCUTANEOUS at 11:52

## 2020-11-10 ASSESSMENT — PAIN DESCRIPTION - ORIENTATION: ORIENTATION: RIGHT

## 2020-11-10 ASSESSMENT — PAIN DESCRIPTION - PAIN TYPE: TYPE: CHRONIC PAIN

## 2020-11-10 ASSESSMENT — PAIN DESCRIPTION - LOCATION: LOCATION: SHOULDER

## 2020-11-10 ASSESSMENT — PAIN SCALES - GENERAL: PAINLEVEL_OUTOF10: 6

## 2020-11-10 NOTE — PROGRESS NOTES
labwork used from last week for injection blood pressure better. Tolerates injection well will follow up with Dr Andrew Harp for better blood pressure control son to call today.  Discharged via wheelchair in stable condition

## 2020-11-24 ENCOUNTER — HOSPITAL ENCOUNTER (OUTPATIENT)
Dept: NURSING | Age: 85
Setting detail: INFUSION SERIES
Discharge: HOME | End: 2020-11-24
Payer: MEDICARE

## 2020-11-24 VITALS
RESPIRATION RATE: 20 BRPM | DIASTOLIC BLOOD PRESSURE: 78 MMHG | SYSTOLIC BLOOD PRESSURE: 152 MMHG | HEART RATE: 67 BPM | TEMPERATURE: 97.2 F

## 2020-11-24 DIAGNOSIS — D50.0 IRON DEFICIENCY ANEMIA SECONDARY TO BLOOD LOSS (CHRONIC): Primary | ICD-10-CM

## 2020-11-24 LAB
HCT VFR BLD CALC: 33.2 % (ref 40.5–52.5)
HEMOGLOBIN: 10.9 G/DL (ref 13.5–17.5)

## 2020-11-24 PROCEDURE — 96372 THER/PROPH/DIAG INJ SC/IM: CPT

## 2020-11-24 PROCEDURE — 85018 HEMOGLOBIN: CPT

## 2020-11-24 PROCEDURE — 85014 HEMATOCRIT: CPT

## 2020-11-24 PROCEDURE — 36415 COLL VENOUS BLD VENIPUNCTURE: CPT

## 2020-11-24 PROCEDURE — 6360000002 HC RX W HCPCS: Performed by: INTERNAL MEDICINE

## 2020-11-24 RX ORDER — HYDRALAZINE HYDROCHLORIDE 25 MG/1
25 TABLET, FILM COATED ORAL 3 TIMES DAILY
COMMUNITY

## 2020-11-24 RX ADMIN — DARBEPOETIN ALFA 30 MCG: 40 INJECTION, SOLUTION INTRAVENOUS; SUBCUTANEOUS at 12:04

## 2020-11-24 ASSESSMENT — PAIN - FUNCTIONAL ASSESSMENT: PAIN_FUNCTIONAL_ASSESSMENT: 0-10

## 2020-12-08 ENCOUNTER — HOSPITAL ENCOUNTER (OUTPATIENT)
Dept: NURSING | Age: 85
Setting detail: INFUSION SERIES
Discharge: HOME | End: 2020-12-08
Payer: MEDICARE

## 2020-12-08 LAB
HCT VFR BLD CALC: 33.8 % (ref 40.5–52.5)
HEMOGLOBIN: 11.2 G/DL (ref 13.5–17.5)

## 2020-12-08 PROCEDURE — 85018 HEMOGLOBIN: CPT

## 2020-12-08 PROCEDURE — 99211 OFF/OP EST MAY X REQ PHY/QHP: CPT

## 2020-12-08 PROCEDURE — 85014 HEMATOCRIT: CPT

## 2020-12-08 PROCEDURE — 36415 COLL VENOUS BLD VENIPUNCTURE: CPT

## 2020-12-08 NOTE — PROGRESS NOTES
Hemoglobin 11.2 no aranesp gien per orders patient and family member notified of results appointment for followup made for December 22nd

## 2020-12-19 ENCOUNTER — APPOINTMENT (OUTPATIENT)
Dept: CT IMAGING | Age: 85
DRG: 351 | End: 2020-12-19
Payer: MEDICARE

## 2020-12-19 ENCOUNTER — HOSPITAL ENCOUNTER (INPATIENT)
Age: 85
LOS: 4 days | Discharge: HOME HEALTH | DRG: 351 | End: 2020-12-23
Attending: EMERGENCY MEDICINE | Admitting: HOSPITALIST
Payer: MEDICARE

## 2020-12-19 ENCOUNTER — APPOINTMENT (OUTPATIENT)
Dept: GENERAL RADIOLOGY | Age: 85
DRG: 351 | End: 2020-12-19
Payer: MEDICARE

## 2020-12-19 ENCOUNTER — ANESTHESIA (OUTPATIENT)
Dept: OPERATING ROOM | Age: 85
DRG: 351 | End: 2020-12-19
Payer: MEDICARE

## 2020-12-19 ENCOUNTER — ANESTHESIA EVENT (OUTPATIENT)
Dept: OPERATING ROOM | Age: 85
DRG: 351 | End: 2020-12-19
Payer: MEDICARE

## 2020-12-19 VITALS
DIASTOLIC BLOOD PRESSURE: 64 MMHG | OXYGEN SATURATION: 100 % | RESPIRATION RATE: 10 BRPM | SYSTOLIC BLOOD PRESSURE: 138 MMHG

## 2020-12-19 PROBLEM — K56.609 SBO (SMALL BOWEL OBSTRUCTION) (HCC): Status: ACTIVE | Noted: 2020-12-19

## 2020-12-19 PROBLEM — N18.5 STAGE 5 CHRONIC KIDNEY DISEASE NOT ON CHRONIC DIALYSIS (HCC): Status: ACTIVE | Noted: 2020-12-19

## 2020-12-19 PROBLEM — K40.30 INGUINAL HERNIA WITH BOWEL OBSTRUCTION: Status: ACTIVE | Noted: 2020-12-19

## 2020-12-19 LAB
A/G RATIO: 1.2 (ref 1.1–2.2)
ALBUMIN SERPL-MCNC: 3.8 G/DL (ref 3.4–5)
ALP BLD-CCNC: 100 U/L (ref 40–129)
ALT SERPL-CCNC: 10 U/L (ref 10–40)
ANION GAP SERPL CALCULATED.3IONS-SCNC: 16 MMOL/L (ref 3–16)
AST SERPL-CCNC: 19 U/L (ref 15–37)
BASOPHILS ABSOLUTE: 0.1 K/UL (ref 0–0.2)
BASOPHILS RELATIVE PERCENT: 0.6 %
BILIRUB SERPL-MCNC: 0.3 MG/DL (ref 0–1)
BUN BLDV-MCNC: 84 MG/DL (ref 7–20)
CALCIUM SERPL-MCNC: 10.2 MG/DL (ref 8.3–10.6)
CHLORIDE BLD-SCNC: 99 MMOL/L (ref 99–110)
CO2: 26 MMOL/L (ref 21–32)
CREAT SERPL-MCNC: 5.2 MG/DL (ref 0.8–1.3)
EOSINOPHILS ABSOLUTE: 0 K/UL (ref 0–0.6)
EOSINOPHILS RELATIVE PERCENT: 0.3 %
GFR AFRICAN AMERICAN: 13
GFR NON-AFRICAN AMERICAN: 10
GLOBULIN: 3.3 G/DL
GLUCOSE BLD-MCNC: 139 MG/DL (ref 70–99)
GLUCOSE BLD-MCNC: 188 MG/DL (ref 70–99)
HCT VFR BLD CALC: 38.3 % (ref 40.5–52.5)
HEMOGLOBIN: 12.7 G/DL (ref 13.5–17.5)
LIPASE: 21 U/L (ref 13–60)
LYMPHOCYTES ABSOLUTE: 1.4 K/UL (ref 1–5.1)
LYMPHOCYTES RELATIVE PERCENT: 12.4 %
MCH RBC QN AUTO: 29.1 PG (ref 26–34)
MCHC RBC AUTO-ENTMCNC: 33.2 G/DL (ref 31–36)
MCV RBC AUTO: 87.5 FL (ref 80–100)
MONOCYTES ABSOLUTE: 0.8 K/UL (ref 0–1.3)
MONOCYTES RELATIVE PERCENT: 7.7 %
NEUTROPHILS ABSOLUTE: 8.7 K/UL (ref 1.7–7.7)
NEUTROPHILS RELATIVE PERCENT: 79 %
PDW BLD-RTO: 14.1 % (ref 12.4–15.4)
PERFORMED ON: ABNORMAL
PLATELET # BLD: 186 K/UL (ref 135–450)
PMV BLD AUTO: 8.8 FL (ref 5–10.5)
POTASSIUM REFLEX MAGNESIUM: 4.4 MMOL/L (ref 3.5–5.1)
RBC # BLD: 4.38 M/UL (ref 4.2–5.9)
SARS-COV-2, NAAT: NOT DETECTED
SODIUM BLD-SCNC: 141 MMOL/L (ref 136–145)
TOTAL PROTEIN: 7.1 G/DL (ref 6.4–8.2)
TROPONIN: 0.06 NG/ML
WBC # BLD: 11 K/UL (ref 4–11)

## 2020-12-19 PROCEDURE — 2580000003 HC RX 258: Performed by: PHYSICIAN ASSISTANT

## 2020-12-19 PROCEDURE — 2580000003 HC RX 258: Performed by: SURGERY

## 2020-12-19 PROCEDURE — 2580000003 HC RX 258: Performed by: HOSPITALIST

## 2020-12-19 PROCEDURE — 2709999900 HC NON-CHARGEABLE SUPPLY: Performed by: SURGERY

## 2020-12-19 PROCEDURE — 84484 ASSAY OF TROPONIN QUANT: CPT

## 2020-12-19 PROCEDURE — 7100000001 HC PACU RECOVERY - ADDTL 15 MIN: Performed by: SURGERY

## 2020-12-19 PROCEDURE — 99285 EMERGENCY DEPT VISIT HI MDM: CPT

## 2020-12-19 PROCEDURE — 1200000000 HC SEMI PRIVATE

## 2020-12-19 PROCEDURE — 3600000002 HC SURGERY LEVEL 2 BASE: Performed by: SURGERY

## 2020-12-19 PROCEDURE — 6360000002 HC RX W HCPCS: Performed by: ANESTHESIOLOGY

## 2020-12-19 PROCEDURE — 6360000002 HC RX W HCPCS: Performed by: PHYSICIAN ASSISTANT

## 2020-12-19 PROCEDURE — 96375 TX/PRO/DX INJ NEW DRUG ADDON: CPT

## 2020-12-19 PROCEDURE — 0YU50JZ SUPPLEMENT RIGHT INGUINAL REGION WITH SYNTHETIC SUBSTITUTE, OPEN APPROACH: ICD-10-PCS | Performed by: SURGERY

## 2020-12-19 PROCEDURE — 83690 ASSAY OF LIPASE: CPT

## 2020-12-19 PROCEDURE — 85025 COMPLETE CBC W/AUTO DIFF WBC: CPT

## 2020-12-19 PROCEDURE — 2500000003 HC RX 250 WO HCPCS: Performed by: SURGERY

## 2020-12-19 PROCEDURE — 49507 PRP I/HERN INIT BLOCK >5 YR: CPT | Performed by: SURGERY

## 2020-12-19 PROCEDURE — 2500000003 HC RX 250 WO HCPCS: Performed by: ANESTHESIOLOGY

## 2020-12-19 PROCEDURE — 6360000002 HC RX W HCPCS: Performed by: HOSPITALIST

## 2020-12-19 PROCEDURE — 7100000000 HC PACU RECOVERY - FIRST 15 MIN: Performed by: SURGERY

## 2020-12-19 PROCEDURE — 93005 ELECTROCARDIOGRAM TRACING: CPT | Performed by: PHYSICIAN ASSISTANT

## 2020-12-19 PROCEDURE — 96374 THER/PROPH/DIAG INJ IV PUSH: CPT

## 2020-12-19 PROCEDURE — U0002 COVID-19 LAB TEST NON-CDC: HCPCS

## 2020-12-19 PROCEDURE — 3600000012 HC SURGERY LEVEL 2 ADDTL 15MIN: Performed by: SURGERY

## 2020-12-19 PROCEDURE — 74176 CT ABD & PELVIS W/O CONTRAST: CPT

## 2020-12-19 PROCEDURE — 99223 1ST HOSP IP/OBS HIGH 75: CPT | Performed by: SURGERY

## 2020-12-19 PROCEDURE — 6360000002 HC RX W HCPCS: Performed by: SURGERY

## 2020-12-19 PROCEDURE — 3700000000 HC ANESTHESIA ATTENDED CARE: Performed by: SURGERY

## 2020-12-19 PROCEDURE — 2580000003 HC RX 258: Performed by: ANESTHESIOLOGY

## 2020-12-19 PROCEDURE — 71045 X-RAY EXAM CHEST 1 VIEW: CPT

## 2020-12-19 PROCEDURE — 80053 COMPREHEN METABOLIC PANEL: CPT

## 2020-12-19 PROCEDURE — C1781 MESH (IMPLANTABLE): HCPCS | Performed by: SURGERY

## 2020-12-19 PROCEDURE — 74018 RADEX ABDOMEN 1 VIEW: CPT

## 2020-12-19 PROCEDURE — 3700000001 HC ADD 15 MINUTES (ANESTHESIA): Performed by: SURGERY

## 2020-12-19 DEVICE — IMPLANTABLE DEVICE: Type: IMPLANTABLE DEVICE | Site: INGUINAL | Status: FUNCTIONAL

## 2020-12-19 RX ORDER — ACETAMINOPHEN 325 MG/1
650 TABLET ORAL EVERY 6 HOURS PRN
Status: DISCONTINUED | OUTPATIENT
Start: 2020-12-19 | End: 2020-12-23 | Stop reason: HOSPADM

## 2020-12-19 RX ORDER — 0.9 % SODIUM CHLORIDE 0.9 %
1000 INTRAVENOUS SOLUTION INTRAVENOUS ONCE
Status: COMPLETED | OUTPATIENT
Start: 2020-12-19 | End: 2020-12-19

## 2020-12-19 RX ORDER — PROMETHAZINE HYDROCHLORIDE 25 MG/ML
6.25 INJECTION, SOLUTION INTRAMUSCULAR; INTRAVENOUS
Status: DISCONTINUED | OUTPATIENT
Start: 2020-12-19 | End: 2020-12-19 | Stop reason: HOSPADM

## 2020-12-19 RX ORDER — ERYTHROMYCIN 5 MG/G
1 OINTMENT OPHTHALMIC NIGHTLY
Status: DISCONTINUED | OUTPATIENT
Start: 2020-12-20 | End: 2020-12-23 | Stop reason: HOSPADM

## 2020-12-19 RX ORDER — DEXTROSE MONOHYDRATE 25 G/50ML
12.5 INJECTION, SOLUTION INTRAVENOUS PRN
Status: DISCONTINUED | OUTPATIENT
Start: 2020-12-19 | End: 2020-12-23 | Stop reason: HOSPADM

## 2020-12-19 RX ORDER — PHENYLEPHRINE HCL IN 0.9% NACL 1 MG/10 ML
SYRINGE (ML) INTRAVENOUS PRN
Status: DISCONTINUED | OUTPATIENT
Start: 2020-12-19 | End: 2020-12-19 | Stop reason: SDUPTHER

## 2020-12-19 RX ORDER — PROMETHAZINE HYDROCHLORIDE 25 MG/1
12.5 TABLET ORAL EVERY 6 HOURS PRN
Status: DISCONTINUED | OUTPATIENT
Start: 2020-12-19 | End: 2020-12-23 | Stop reason: HOSPADM

## 2020-12-19 RX ORDER — DIPHENHYDRAMINE HYDROCHLORIDE 50 MG/ML
12.5 INJECTION INTRAMUSCULAR; INTRAVENOUS
Status: DISCONTINUED | OUTPATIENT
Start: 2020-12-19 | End: 2020-12-19 | Stop reason: HOSPADM

## 2020-12-19 RX ORDER — PREDNISOLONE ACETATE 10 MG/ML
1 SUSPENSION/ DROPS OPHTHALMIC 2 TIMES DAILY
Status: DISCONTINUED | OUTPATIENT
Start: 2020-12-20 | End: 2020-12-23 | Stop reason: HOSPADM

## 2020-12-19 RX ORDER — TIMOLOL MALEATE 5 MG/ML
1 SOLUTION/ DROPS OPHTHALMIC 2 TIMES DAILY
Status: DISCONTINUED | OUTPATIENT
Start: 2020-12-20 | End: 2020-12-23 | Stop reason: HOSPADM

## 2020-12-19 RX ORDER — SODIUM CHLORIDE 9 MG/ML
INJECTION, SOLUTION INTRAVENOUS CONTINUOUS
Status: ACTIVE | OUTPATIENT
Start: 2020-12-19 | End: 2020-12-20

## 2020-12-19 RX ORDER — SODIUM CHLORIDE 0.9 % (FLUSH) 0.9 %
10 SYRINGE (ML) INJECTION PRN
Status: DISCONTINUED | OUTPATIENT
Start: 2020-12-19 | End: 2020-12-20

## 2020-12-19 RX ORDER — EPHEDRINE SULFATE 50 MG/ML
INJECTION, SOLUTION INTRAVENOUS PRN
Status: DISCONTINUED | OUTPATIENT
Start: 2020-12-19 | End: 2020-12-19 | Stop reason: SDUPTHER

## 2020-12-19 RX ORDER — ATROPINE SULFATE 10 MG/ML
1 SOLUTION/ DROPS OPHTHALMIC 2 TIMES DAILY
Status: DISCONTINUED | OUTPATIENT
Start: 2020-12-20 | End: 2020-12-23 | Stop reason: HOSPADM

## 2020-12-19 RX ORDER — ONDANSETRON 2 MG/ML
4 INJECTION INTRAMUSCULAR; INTRAVENOUS
Status: DISCONTINUED | OUTPATIENT
Start: 2020-12-19 | End: 2020-12-19 | Stop reason: HOSPADM

## 2020-12-19 RX ORDER — DEXTROSE MONOHYDRATE 50 MG/ML
100 INJECTION, SOLUTION INTRAVENOUS PRN
Status: DISCONTINUED | OUTPATIENT
Start: 2020-12-19 | End: 2020-12-23 | Stop reason: HOSPADM

## 2020-12-19 RX ORDER — SODIUM CHLORIDE 9 MG/ML
INJECTION, SOLUTION INTRAVENOUS CONTINUOUS PRN
Status: DISCONTINUED | OUTPATIENT
Start: 2020-12-19 | End: 2020-12-19 | Stop reason: SDUPTHER

## 2020-12-19 RX ORDER — LABETALOL HYDROCHLORIDE 5 MG/ML
5 INJECTION, SOLUTION INTRAVENOUS EVERY 10 MIN PRN
Status: DISCONTINUED | OUTPATIENT
Start: 2020-12-19 | End: 2020-12-19 | Stop reason: HOSPADM

## 2020-12-19 RX ORDER — ONDANSETRON 2 MG/ML
4 INJECTION INTRAMUSCULAR; INTRAVENOUS EVERY 6 HOURS PRN
Status: DISCONTINUED | OUTPATIENT
Start: 2020-12-19 | End: 2020-12-23 | Stop reason: HOSPADM

## 2020-12-19 RX ORDER — LIDOCAINE HYDROCHLORIDE 20 MG/ML
INJECTION, SOLUTION INFILTRATION; PERINEURAL PRN
Status: DISCONTINUED | OUTPATIENT
Start: 2020-12-19 | End: 2020-12-19 | Stop reason: SDUPTHER

## 2020-12-19 RX ORDER — ACETAMINOPHEN 650 MG/1
650 SUPPOSITORY RECTAL EVERY 6 HOURS PRN
Status: DISCONTINUED | OUTPATIENT
Start: 2020-12-19 | End: 2020-12-23 | Stop reason: HOSPADM

## 2020-12-19 RX ORDER — HYDROMORPHONE HCL 110MG/55ML
0.5 PATIENT CONTROLLED ANALGESIA SYRINGE INTRAVENOUS EVERY 4 HOURS PRN
Status: DISCONTINUED | OUTPATIENT
Start: 2020-12-19 | End: 2020-12-20

## 2020-12-19 RX ORDER — PROPOFOL 10 MG/ML
INJECTION, EMULSION INTRAVENOUS PRN
Status: DISCONTINUED | OUTPATIENT
Start: 2020-12-19 | End: 2020-12-19 | Stop reason: SDUPTHER

## 2020-12-19 RX ORDER — HYDRALAZINE HYDROCHLORIDE 20 MG/ML
5 INJECTION INTRAMUSCULAR; INTRAVENOUS EVERY 10 MIN PRN
Status: DISCONTINUED | OUTPATIENT
Start: 2020-12-19 | End: 2020-12-19 | Stop reason: HOSPADM

## 2020-12-19 RX ORDER — MEPERIDINE HYDROCHLORIDE 50 MG/ML
12.5 INJECTION INTRAMUSCULAR; INTRAVENOUS; SUBCUTANEOUS EVERY 5 MIN PRN
Status: DISCONTINUED | OUTPATIENT
Start: 2020-12-19 | End: 2020-12-19 | Stop reason: HOSPADM

## 2020-12-19 RX ORDER — BRIMONIDINE TARTRATE 2 MG/ML
1 SOLUTION/ DROPS OPHTHALMIC 3 TIMES DAILY
Status: DISCONTINUED | OUTPATIENT
Start: 2020-12-20 | End: 2020-12-23 | Stop reason: HOSPADM

## 2020-12-19 RX ORDER — SUCCINYLCHOLINE CHLORIDE 20 MG/ML
INJECTION INTRAMUSCULAR; INTRAVENOUS PRN
Status: DISCONTINUED | OUTPATIENT
Start: 2020-12-19 | End: 2020-12-19 | Stop reason: SDUPTHER

## 2020-12-19 RX ORDER — MORPHINE SULFATE 2 MG/ML
2 INJECTION, SOLUTION INTRAMUSCULAR; INTRAVENOUS EVERY 5 MIN PRN
Status: DISCONTINUED | OUTPATIENT
Start: 2020-12-19 | End: 2020-12-19 | Stop reason: HOSPADM

## 2020-12-19 RX ORDER — ONDANSETRON 2 MG/ML
4 INJECTION INTRAMUSCULAR; INTRAVENOUS ONCE
Status: COMPLETED | OUTPATIENT
Start: 2020-12-19 | End: 2020-12-19

## 2020-12-19 RX ORDER — MORPHINE SULFATE 2 MG/ML
1 INJECTION, SOLUTION INTRAMUSCULAR; INTRAVENOUS EVERY 5 MIN PRN
Status: DISCONTINUED | OUTPATIENT
Start: 2020-12-19 | End: 2020-12-19 | Stop reason: HOSPADM

## 2020-12-19 RX ORDER — BUPIVACAINE HYDROCHLORIDE 5 MG/ML
INJECTION, SOLUTION EPIDURAL; INTRACAUDAL PRN
Status: DISCONTINUED | OUTPATIENT
Start: 2020-12-19 | End: 2020-12-19 | Stop reason: ALTCHOICE

## 2020-12-19 RX ORDER — MORPHINE SULFATE 4 MG/ML
4 INJECTION, SOLUTION INTRAMUSCULAR; INTRAVENOUS ONCE
Status: COMPLETED | OUTPATIENT
Start: 2020-12-19 | End: 2020-12-19

## 2020-12-19 RX ORDER — OXYCODONE HYDROCHLORIDE AND ACETAMINOPHEN 5; 325 MG/1; MG/1
1 TABLET ORAL PRN
Status: DISCONTINUED | OUTPATIENT
Start: 2020-12-19 | End: 2020-12-19 | Stop reason: HOSPADM

## 2020-12-19 RX ORDER — NICOTINE POLACRILEX 4 MG
15 LOZENGE BUCCAL PRN
Status: DISCONTINUED | OUTPATIENT
Start: 2020-12-19 | End: 2020-12-23 | Stop reason: HOSPADM

## 2020-12-19 RX ORDER — OXYCODONE HYDROCHLORIDE AND ACETAMINOPHEN 5; 325 MG/1; MG/1
2 TABLET ORAL PRN
Status: DISCONTINUED | OUTPATIENT
Start: 2020-12-19 | End: 2020-12-19 | Stop reason: HOSPADM

## 2020-12-19 RX ADMIN — SODIUM CHLORIDE: 9 INJECTION, SOLUTION INTRAVENOUS at 22:04

## 2020-12-19 RX ADMIN — LIDOCAINE HYDROCHLORIDE 50 MG: 20 INJECTION, SOLUTION INFILTRATION; PERINEURAL at 21:18

## 2020-12-19 RX ADMIN — MORPHINE SULFATE 4 MG: 4 INJECTION, SOLUTION INTRAMUSCULAR; INTRAVENOUS at 14:46

## 2020-12-19 RX ADMIN — HYDROMORPHONE HYDROCHLORIDE 0.5 MG: 1 INJECTION, SOLUTION INTRAMUSCULAR; INTRAVENOUS; SUBCUTANEOUS at 19:36

## 2020-12-19 RX ADMIN — Medication 100 MCG: at 21:47

## 2020-12-19 RX ADMIN — PROPOFOL 90 MG: 10 INJECTION, EMULSION INTRAVENOUS at 21:18

## 2020-12-19 RX ADMIN — SODIUM CHLORIDE: 9 INJECTION, SOLUTION INTRAVENOUS at 19:31

## 2020-12-19 RX ADMIN — EPHEDRINE SULFATE 20 MG: 50 INJECTION INTRAMUSCULAR; INTRAVENOUS; SUBCUTANEOUS at 22:07

## 2020-12-19 RX ADMIN — ONDANSETRON 4 MG: 2 INJECTION INTRAMUSCULAR; INTRAVENOUS at 14:46

## 2020-12-19 RX ADMIN — Medication 100 MCG: at 21:40

## 2020-12-19 RX ADMIN — Medication 100 MCG: at 22:02

## 2020-12-19 RX ADMIN — MEROPENEM 500 MG: 500 INJECTION, POWDER, FOR SOLUTION INTRAVENOUS at 20:45

## 2020-12-19 RX ADMIN — Medication 100 MCG: at 21:30

## 2020-12-19 RX ADMIN — SODIUM CHLORIDE 1000 ML: 9 INJECTION, SOLUTION INTRAVENOUS at 14:46

## 2020-12-19 RX ADMIN — SUCCINYLCHOLINE CHLORIDE 140 MG: 20 INJECTION, SOLUTION INTRAMUSCULAR; INTRAVENOUS at 21:18

## 2020-12-19 RX ADMIN — Medication 100 MCG: at 21:18

## 2020-12-19 RX ADMIN — Medication 100 MCG: at 21:34

## 2020-12-19 RX ADMIN — SODIUM CHLORIDE: 9 INJECTION, SOLUTION INTRAVENOUS at 21:12

## 2020-12-19 ASSESSMENT — PULMONARY FUNCTION TESTS
PIF_VALUE: 12
PIF_VALUE: 4
PIF_VALUE: 13
PIF_VALUE: 12
PIF_VALUE: 15
PIF_VALUE: 0
PIF_VALUE: 12
PIF_VALUE: 23
PIF_VALUE: 11
PIF_VALUE: 13
PIF_VALUE: 2
PIF_VALUE: 13
PIF_VALUE: 12
PIF_VALUE: 11
PIF_VALUE: 11
PIF_VALUE: 1
PIF_VALUE: 13
PIF_VALUE: 11
PIF_VALUE: 11
PIF_VALUE: 12
PIF_VALUE: 12
PIF_VALUE: 0
PIF_VALUE: 12
PIF_VALUE: 11
PIF_VALUE: 12
PIF_VALUE: 20
PIF_VALUE: 4
PIF_VALUE: 12
PIF_VALUE: 12
PIF_VALUE: 3
PIF_VALUE: 2
PIF_VALUE: 13
PIF_VALUE: 13
PIF_VALUE: 12
PIF_VALUE: 12
PIF_VALUE: 11
PIF_VALUE: 13
PIF_VALUE: 5
PIF_VALUE: 11
PIF_VALUE: 12
PIF_VALUE: 12
PIF_VALUE: 13
PIF_VALUE: 10
PIF_VALUE: 11
PIF_VALUE: 12
PIF_VALUE: 0
PIF_VALUE: 12
PIF_VALUE: 11
PIF_VALUE: 13
PIF_VALUE: 3
PIF_VALUE: 12
PIF_VALUE: 13
PIF_VALUE: 1
PIF_VALUE: 12
PIF_VALUE: 12
PIF_VALUE: 1
PIF_VALUE: 12
PIF_VALUE: 12
PIF_VALUE: 11
PIF_VALUE: 1
PIF_VALUE: 3
PIF_VALUE: 11
PIF_VALUE: 12
PIF_VALUE: 11
PIF_VALUE: 12

## 2020-12-19 ASSESSMENT — PAIN SCALES - GENERAL
PAINLEVEL_OUTOF10: 8
PAINLEVEL_OUTOF10: 0
PAINLEVEL_OUTOF10: 2

## 2020-12-19 ASSESSMENT — PAIN DESCRIPTION - PAIN TYPE: TYPE: ACUTE PAIN

## 2020-12-19 ASSESSMENT — PAIN DESCRIPTION - LOCATION: LOCATION: ABDOMEN

## 2020-12-19 NOTE — ED PROVIDER NOTES
 Years of education: Not on file    Highest education level: Not on file   Occupational History    Not on file   Social Needs    Financial resource strain: Not on file    Food insecurity     Worry: Not on file     Inability: Not on file    Transportation needs     Medical: Not on file     Non-medical: Not on file   Tobacco Use    Smoking status: Former Smoker     Quit date: 3/28/1991     Years since quittin.7    Smokeless tobacco: Never Used   Substance and Sexual Activity    Alcohol use: No    Drug use: No    Sexual activity: Not on file   Lifestyle    Physical activity     Days per week: Not on file     Minutes per session: Not on file    Stress: Not on file   Relationships    Social connections     Talks on phone: Not on file     Gets together: Not on file     Attends Church service: Not on file     Active member of club or organization: Not on file     Attends meetings of clubs or organizations: Not on file     Relationship status: Not on file    Intimate partner violence     Fear of current or ex partner: Not on file     Emotionally abused: Not on file     Physically abused: Not on file     Forced sexual activity: Not on file   Other Topics Concern    Not on file   Social History Narrative    Not on file     Current Facility-Administered Medications   Medication Dose Route Frequency Provider Last Rate Last Admin    0.9 % sodium chloride bolus  1,000 mL Intravenous Once GAYLA Newell 1,000 mL/hr at 20 1446 1,000 mL at 20 1446     Current Outpatient Medications   Medication Sig Dispense Refill    hydrALAZINE (APRESOLINE) 25 MG tablet Take 25 mg by mouth 3 times daily      furosemide (LASIX) 40 MG tablet Take 40 mg by mouth See Admin Instructions 3 times a week      aspirin 81 MG EC tablet Take 1 tablet by mouth daily 30 tablet 0    insulin glargine (LANTUS SOLOSTAR) 100 UNIT/ML injection pen Inject 10 Units into the skin nightly  ferrous sulfate (IRON 325) 325 (65 Fe) MG tablet Take 325 mg by mouth daily      Cholecalciferol 50 MCG (2000 UT) CAPS Take 1 capsule by mouth daily      brimonidine (ALPHAGAN) 0.2 % ophthalmic solution Place 1 drop into the left eye 3 times daily      timolol (TIMOPTIC) 0.5 % ophthalmic solution Place 1 drop into the right eye 2 times daily      erythromycin (ROMYCIN) 5 MG/GM ophthalmic ointment Place 1 cm into the right eye nightly Lower eyelid      SITagliptin (JANUVIA) 25 MG tablet Take 25 mg by mouth daily      sodium bicarbonate 325 MG tablet Take 650 mg by mouth 3 times daily      Netarsudil-Latanoprost (ROCKLATAN) 0.02-0.005 % SOLN Apply 1 drop to eye daily      ondansetron (ZOFRAN) 4 MG tablet Take 1 tablet by mouth every 8 hours as needed for Nausea 10 tablet 0    lovastatin (MEVACOR) 20 MG tablet Take 20 mg by mouth daily.  Dorzolamide HCl-Timolol Mal (COSOPT OP) Apply 1 drop to eye 2 times daily. Both eyes       prednisoLONE acetate (PRED FORTE) 1 % ophthalmic suspension Place 1 drop into the left eye 2 times daily.  atropine 1 % ophthalmic solution Place 1 drop into the left eye 2 times daily. Allergies   Allergen Reactions    No Known Allergies        REVIEW OF SYSTEMS  10 systems reviewed, pertinent positives per HPI otherwise noted to be negative    PHYSICAL EXAM  BP (!) 156/74   Pulse 70   Temp 97.9 °F (36.6 °C) (Oral)   Resp 18   Ht 6' 4\" (1.93 m)   Wt 185 lb (83.9 kg)   SpO2 98%   BMI 22.52 kg/m²   GENERAL APPEARANCE: Awake and alert. Cooperative. No acute distress. HEAD: Normocephalic. Atraumatic. EYES: PERRL. EOM's grossly intact. ENT: Mucous membranes are moist.   NECK: Supple. HEART: RRR. No murmurs. LUNGS: Respirations unlabored. CTAB. Good air exchange. Speaking comfortably in full sentences. Patient received morphine and Zofran IV for pain and nausea, with good relief. Triage vitals stable. Given a 1 L IV fluid bolus. CBC without leukocytosis or anemia. CMP showing elevated BUN and creatinine at 84 and 52 with GFR of 10. Normal magnesium. According to chart review patient does have history of renal insufficiency although this is worse than baseline. Troponin 0 0.06. Again chronically elevated although today slightly higher than normal.  Suspecting that this is from worsening kidney function. Continues to deny chest pain. EKG appear consistent with A. fib RVR at a rate of 100. .  Lipase normal.  Chest x-ray with questionable retrocardiac infiltrate. Covid negative. CT abdomen and pelvis consistent with high-grade distal small bowel obstruction which appears to have transition point in the right inguinal hernia. NG tube placed. Hospitalist and general surgery consults are pending. A discussion was had with Mr. Mercedez Weaver regarding abdominal pain, ED findings and recommendations for admission. Risk management discussed and shared decision making had with patient and/or surrogate. All questions were answered. Patient in agreement.     MDM  Results for orders placed or performed during the hospital encounter of 12/19/20   CBC Auto Differential   Result Value Ref Range    WBC 11.0 4.0 - 11.0 K/uL    RBC 4.38 4.20 - 5.90 M/uL    Hemoglobin 12.7 (L) 13.5 - 17.5 g/dL    Hematocrit 38.3 (L) 40.5 - 52.5 %    MCV 87.5 80.0 - 100.0 fL    MCH 29.1 26.0 - 34.0 pg    MCHC 33.2 31.0 - 36.0 g/dL    RDW 14.1 12.4 - 15.4 %    Platelets 641 371 - 426 K/uL    MPV 8.8 5.0 - 10.5 fL    Neutrophils % 79.0 %    Lymphocytes % 12.4 %    Monocytes % 7.7 %    Eosinophils % 0.3 %    Basophils % 0.6 %    Neutrophils Absolute 8.7 (H) 1.7 - 7.7 K/uL    Lymphocytes Absolute 1.4 1.0 - 5.1 K/uL    Monocytes Absolute 0.8 0.0 - 1.3 K/uL    Eosinophils Absolute 0.0 0.0 - 0.6 K/uL    Basophils Absolute 0.1 0.0 - 0.2 K/uL POC Glucose 134 (H) 70 - 99 mg/dl    Performed on ACCU-CHEK    POCT Glucose   Result Value Ref Range    POC Glucose 135 (H) 70 - 99 mg/dl    Performed on ACCU-CHEK    EKG 12 Lead   Result Value Ref Range    Ventricular Rate 98 BPM    Atrial Rate 98 BPM    P-R Interval 240 ms    QRS Duration 92 ms    Q-T Interval 356 ms    QTc Calculation (Bazett) 454 ms    R Axis -34 degrees    T Axis 90 degrees    Diagnosis       Sinus rhythm with 1st degree A-V blockLeft axis deviationCannot rule out Anterior infarct , age undeterminedAbnormal ECGWhen compared with ECG of 19-DEC-2020 15:22,Sinus rhythm has replaced Atrial fibrillation     I spoke with Dr. Doris Gastelum who spoke with general surgeon and hospitalist. We thoroughly discussed the history, physical exam, laboratory and imaging studies, as well as, emergency department course. Based upon that discussion, we've decided to admit Jovanna Watson to the hospital for further observation, evaluation, and treatment. Final Impression  1. Small bowel obstruction ([address]    2. LAE (acute kidney injury) ([address]      Blood pressure (!) 156/80, pulse 94, temperature 97.8 °F (36.6 °C), temperature source Oral, resp. rate 16, height 6' 4\" (1.93 m), weight 183 lb 3.2 oz (83.1 kg), SpO2 95 %. DISPOSITION  Patient was admitted to the hospital in stable condition.          [place]  12/20/20 9599

## 2020-12-19 NOTE — ED NOTES
Patient son called for update. Spoke with patient to receive permission. Pt states that he doesn't want to speak to family at this time to update and does not want this RN to update family member. Son notified.       Janelle Morales RN  12/19/20 2630

## 2020-12-19 NOTE — ED NOTES
[facility] sbo/mason  [address] called back     [place]  12/19/20 1347

## 2020-12-19 NOTE — ED PROVIDER NOTES
EKG interpretation:  Normal sinus rhythm, rate 98, no acute ST changes or T wave inversions. Leftward axis deviation. QTc acceptable. Appears unchanged from prior on 10/6/2020. Cirilo Schreiber MD  12/19/20 8085    I independently performed a history and physical on Birgit George. All diagnostic, treatment, and disposition decisions were made by myself in conjunction with the advanced practice provider. Patient with small bowel obstruction due to right inguinal hernia. Also found to have acute kidney injury. Case discussed with Dr. Abdi Hernandez who will likely take him to the operating room this evening. Patient admitted by Dr. Riki Sahu. For further details of [facility] emergency department encounter, please see Vonzell Holstein, PA's documentation.      Cirilo Schreiber MD  12/19/20 2516

## 2020-12-20 LAB
ANION GAP SERPL CALCULATED.3IONS-SCNC: 13 MMOL/L (ref 3–16)
APTT: 18.8 SEC (ref 24.2–36.2)
BACTERIA: ABNORMAL /HPF
BASOPHILS ABSOLUTE: 0.1 K/UL (ref 0–0.2)
BASOPHILS RELATIVE PERCENT: 0.8 %
BILIRUBIN URINE: NEGATIVE
BLOOD, URINE: ABNORMAL
BUN BLDV-MCNC: 81 MG/DL (ref 7–20)
CALCIUM SERPL-MCNC: 8.7 MG/DL (ref 8.3–10.6)
CHLORIDE BLD-SCNC: 107 MMOL/L (ref 99–110)
CLARITY: CLEAR
CO2: 22 MMOL/L (ref 21–32)
COLOR: YELLOW
CREAT SERPL-MCNC: 4.9 MG/DL (ref 0.8–1.3)
EKG ATRIAL RATE: 98 BPM
EKG DIAGNOSIS: NORMAL
EKG P-R INTERVAL: 240 MS
EKG Q-T INTERVAL: 356 MS
EKG QRS DURATION: 92 MS
EKG QTC CALCULATION (BAZETT): 454 MS
EKG R AXIS: -34 DEGREES
EKG T AXIS: 90 DEGREES
EKG VENTRICULAR RATE: 98 BPM
EOSINOPHILS ABSOLUTE: 0.1 K/UL (ref 0–0.6)
EOSINOPHILS RELATIVE PERCENT: 1.1 %
EPITHELIAL CELLS, UA: ABNORMAL /HPF (ref 0–5)
ESTIMATED AVERAGE GLUCOSE: 131.2 MG/DL
GFR AFRICAN AMERICAN: 13
GFR NON-AFRICAN AMERICAN: 11
GLUCOSE BLD-MCNC: 134 MG/DL (ref 70–99)
GLUCOSE BLD-MCNC: 135 MG/DL (ref 70–99)
GLUCOSE BLD-MCNC: 135 MG/DL (ref 70–99)
GLUCOSE BLD-MCNC: 149 MG/DL (ref 70–99)
GLUCOSE BLD-MCNC: 155 MG/DL (ref 70–99)
GLUCOSE BLD-MCNC: 224 MG/DL (ref 70–99)
GLUCOSE URINE: 100 MG/DL
HBA1C MFR BLD: 6.2 %
HCT VFR BLD CALC: 33.3 % (ref 40.5–52.5)
HEMOGLOBIN: 10.9 G/DL (ref 13.5–17.5)
INR BLD: 1.06 (ref 0.86–1.14)
KETONES, URINE: NEGATIVE MG/DL
LACTIC ACID: 1 MMOL/L (ref 0.4–2)
LEUKOCYTE ESTERASE, URINE: NEGATIVE
LYMPHOCYTES ABSOLUTE: 1 K/UL (ref 1–5.1)
LYMPHOCYTES RELATIVE PERCENT: 11.5 %
MAGNESIUM: 1.7 MG/DL (ref 1.8–2.4)
MCH RBC QN AUTO: 28.8 PG (ref 26–34)
MCHC RBC AUTO-ENTMCNC: 32.8 G/DL (ref 31–36)
MCV RBC AUTO: 87.8 FL (ref 80–100)
MICROSCOPIC EXAMINATION: YES
MONOCYTES ABSOLUTE: 1.1 K/UL (ref 0–1.3)
MONOCYTES RELATIVE PERCENT: 13 %
NEUTROPHILS ABSOLUTE: 6.4 K/UL (ref 1.7–7.7)
NEUTROPHILS RELATIVE PERCENT: 73.6 %
NITRITE, URINE: NEGATIVE
PDW BLD-RTO: 14.2 % (ref 12.4–15.4)
PERFORMED ON: ABNORMAL
PH UA: 6 (ref 5–8)
PHOSPHORUS: 6.8 MG/DL (ref 2.5–4.9)
PLATELET # BLD: 150 K/UL (ref 135–450)
PMV BLD AUTO: 8.7 FL (ref 5–10.5)
POTASSIUM SERPL-SCNC: 4.7 MMOL/L (ref 3.5–5.1)
PROTEIN UA: >=300 MG/DL
PROTHROMBIN TIME: 12.3 SEC (ref 10–13.2)
RBC # BLD: 3.8 M/UL (ref 4.2–5.9)
RBC UA: ABNORMAL /HPF (ref 0–4)
REASON FOR REJECTION: NORMAL
REJECTED TEST: NORMAL
SODIUM BLD-SCNC: 142 MMOL/L (ref 136–145)
SPECIFIC GRAVITY UA: 1.02 (ref 1–1.03)
T4 FREE: 1.1 NG/DL (ref 0.9–1.8)
TSH SERPL DL<=0.05 MIU/L-ACNC: 15.65 UIU/ML (ref 0.27–4.2)
URINE TYPE: ABNORMAL
UROBILINOGEN, URINE: 0.2 E.U./DL
WBC # BLD: 8.6 K/UL (ref 4–11)
WBC UA: ABNORMAL /HPF (ref 0–5)

## 2020-12-20 PROCEDURE — 6370000000 HC RX 637 (ALT 250 FOR IP): Performed by: HOSPITALIST

## 2020-12-20 PROCEDURE — 83036 HEMOGLOBIN GLYCOSYLATED A1C: CPT

## 2020-12-20 PROCEDURE — 6370000000 HC RX 637 (ALT 250 FOR IP): Performed by: NURSE PRACTITIONER

## 2020-12-20 PROCEDURE — 84100 ASSAY OF PHOSPHORUS: CPT

## 2020-12-20 PROCEDURE — 81001 URINALYSIS AUTO W/SCOPE: CPT

## 2020-12-20 PROCEDURE — 51702 INSERT TEMP BLADDER CATH: CPT

## 2020-12-20 PROCEDURE — 1200000000 HC SEMI PRIVATE

## 2020-12-20 PROCEDURE — 93010 ELECTROCARDIOGRAM REPORT: CPT | Performed by: INTERNAL MEDICINE

## 2020-12-20 PROCEDURE — 2500000003 HC RX 250 WO HCPCS: Performed by: SURGERY

## 2020-12-20 PROCEDURE — 84443 ASSAY THYROID STIM HORMONE: CPT

## 2020-12-20 PROCEDURE — 85730 THROMBOPLASTIN TIME PARTIAL: CPT

## 2020-12-20 PROCEDURE — 2580000003 HC RX 258: Performed by: SURGERY

## 2020-12-20 PROCEDURE — 6360000002 HC RX W HCPCS: Performed by: SURGERY

## 2020-12-20 PROCEDURE — 84439 ASSAY OF FREE THYROXINE: CPT

## 2020-12-20 PROCEDURE — 2580000003 HC RX 258: Performed by: NURSE PRACTITIONER

## 2020-12-20 PROCEDURE — 80048 BASIC METABOLIC PNL TOTAL CA: CPT

## 2020-12-20 PROCEDURE — 85025 COMPLETE CBC W/AUTO DIFF WBC: CPT

## 2020-12-20 PROCEDURE — 85610 PROTHROMBIN TIME: CPT

## 2020-12-20 PROCEDURE — 87040 BLOOD CULTURE FOR BACTERIA: CPT

## 2020-12-20 PROCEDURE — 83605 ASSAY OF LACTIC ACID: CPT

## 2020-12-20 PROCEDURE — 99024 POSTOP FOLLOW-UP VISIT: CPT | Performed by: SURGERY

## 2020-12-20 PROCEDURE — 83735 ASSAY OF MAGNESIUM: CPT

## 2020-12-20 PROCEDURE — 36415 COLL VENOUS BLD VENIPUNCTURE: CPT

## 2020-12-20 RX ORDER — SODIUM CHLORIDE 9 MG/ML
INJECTION, SOLUTION INTRAVENOUS CONTINUOUS
Status: DISCONTINUED | OUTPATIENT
Start: 2020-12-20 | End: 2020-12-23 | Stop reason: HOSPADM

## 2020-12-20 RX ORDER — OXYCODONE HYDROCHLORIDE 5 MG/1
5 TABLET ORAL EVERY 4 HOURS PRN
Status: DISCONTINUED | OUTPATIENT
Start: 2020-12-20 | End: 2020-12-23 | Stop reason: HOSPADM

## 2020-12-20 RX ORDER — OXYCODONE HYDROCHLORIDE 5 MG/1
10 TABLET ORAL EVERY 4 HOURS PRN
Status: DISCONTINUED | OUTPATIENT
Start: 2020-12-20 | End: 2020-12-23 | Stop reason: HOSPADM

## 2020-12-20 RX ORDER — HYDRALAZINE HYDROCHLORIDE 25 MG/1
25 TABLET, FILM COATED ORAL 3 TIMES DAILY
Status: DISCONTINUED | OUTPATIENT
Start: 2020-12-20 | End: 2020-12-23 | Stop reason: HOSPADM

## 2020-12-20 RX ORDER — LABETALOL HYDROCHLORIDE 5 MG/ML
10 INJECTION, SOLUTION INTRAVENOUS EVERY 4 HOURS PRN
Status: DISCONTINUED | OUTPATIENT
Start: 2020-12-20 | End: 2020-12-23 | Stop reason: HOSPADM

## 2020-12-20 RX ADMIN — BRIMONIDINE TARTRATE 1 DROP: 2 SOLUTION OPHTHALMIC at 21:07

## 2020-12-20 RX ADMIN — HYDRALAZINE HYDROCHLORIDE 25 MG: 25 TABLET, FILM COATED ORAL at 17:58

## 2020-12-20 RX ADMIN — INSULIN LISPRO 4 UNITS: 100 INJECTION, SOLUTION INTRAVENOUS; SUBCUTANEOUS at 18:01

## 2020-12-20 RX ADMIN — FAMOTIDINE 20 MG: 10 INJECTION INTRAVENOUS at 09:44

## 2020-12-20 RX ADMIN — SODIUM CHLORIDE: 9 INJECTION, SOLUTION INTRAVENOUS at 15:51

## 2020-12-20 RX ADMIN — PREDNISOLONE ACETATE 1 DROP: 10 SUSPENSION/ DROPS OPHTHALMIC at 14:42

## 2020-12-20 RX ADMIN — INSULIN LISPRO 2 UNITS: 100 INJECTION, SOLUTION INTRAVENOUS; SUBCUTANEOUS at 21:04

## 2020-12-20 RX ADMIN — HYDRALAZINE HYDROCHLORIDE 25 MG: 25 TABLET, FILM COATED ORAL at 21:08

## 2020-12-20 RX ADMIN — TIMOLOL MALEATE 1 DROP: 5 SOLUTION/ DROPS OPHTHALMIC at 22:11

## 2020-12-20 RX ADMIN — BRIMONIDINE TARTRATE 1 DROP: 2 SOLUTION OPHTHALMIC at 09:45

## 2020-12-20 RX ADMIN — MEROPENEM 500 MG: 500 INJECTION, POWDER, FOR SOLUTION INTRAVENOUS at 14:48

## 2020-12-20 RX ADMIN — MEROPENEM 500 MG: 500 INJECTION, POWDER, FOR SOLUTION INTRAVENOUS at 05:00

## 2020-12-20 RX ADMIN — PREDNISOLONE ACETATE 1 DROP: 10 SUSPENSION/ DROPS OPHTHALMIC at 21:08

## 2020-12-20 RX ADMIN — SODIUM CHLORIDE: 9 INJECTION, SOLUTION INTRAVENOUS at 09:32

## 2020-12-20 RX ADMIN — BRIMONIDINE TARTRATE 1 DROP: 2 SOLUTION OPHTHALMIC at 14:45

## 2020-12-20 RX ADMIN — ERYTHROMYCIN 1 CM: 5 OINTMENT OPHTHALMIC at 21:06

## 2020-12-20 RX ADMIN — TIMOLOL MALEATE 1 DROP: 5 SOLUTION/ DROPS OPHTHALMIC at 09:47

## 2020-12-20 RX ADMIN — HYDROMORPHONE HYDROCHLORIDE 0.5 MG: 2 INJECTION INTRAMUSCULAR; INTRAVENOUS; SUBCUTANEOUS at 04:16

## 2020-12-20 ASSESSMENT — PAIN SCALES - GENERAL
PAINLEVEL_OUTOF10: 0
PAINLEVEL_OUTOF10: 6

## 2020-12-20 NOTE — ANESTHESIA PRE PROCEDURE
lovastatin (MEVACOR) 20 MG tablet Take 20 mg by mouth daily. Historical Provider, MD   Dorzolamide HCl-Timolol Mal (COSOPT OP) Apply 1 drop to eye 2 times daily. Both eyes     Historical Provider, MD   prednisoLONE acetate (PRED FORTE) 1 % ophthalmic suspension Place 1 drop into the left eye 2 times daily. Historical Provider, MD   atropine 1 % ophthalmic solution Place 1 drop into the left eye 2 times daily. Historical Provider, MD       Current medications:    Current Facility-Administered Medications   Medication Dose Route Frequency Provider Last Rate Last Admin    HYDROmorphone (DILAUDID) injection 0.5 mg  0.5 mg Intravenous Q3H PRN Shayy Colbert MD   0.5 mg at 12/19/20 1936    0.9 % sodium chloride infusion   Intravenous Continuous Shayy Colbert MD 75 mL/hr at 12/19/20 1931 New Bag at 12/19/20 1931    meropenem (MERREM) 500 mg IVPB (mini-bag)  500 mg Intravenous Once Yobani Eubanks MD   500 mg at 12/19/20 2045       Allergies:     Allergies   Allergen Reactions    No Known Allergies        Problem List:    Patient Active Problem List   Diagnosis Code    Diabetes mellitus ([facility] E11.9    Hyperlipidemia E78.5    Hypertension I10    Left hip pain M25.552    Iliopsoas muscle hematoma, left, 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    Anemia due to blood loss, acute D62    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 Juliet Hawk patient    Diabetes mellitus ([facility]     Hyperlipidemia     Hypertension     Urinary problem     self cath tid    Wears glasses        Past Surgical History:

## 2020-12-20 NOTE — ED NOTES
at bedside to assess patient. Reports will be taking to surgery tonight. Consent form obtained and to be signed by patient following surgeon explanation of procedure.       [place]bania Logan RN  12/19/20 2007

## 2020-12-20 NOTE — CONSULTS
Nephrology Consult Note  [phone]   [email or web address]        Reason for Consult:  ALE, CKD    HISTORY OF PRESENT ILLNESS:                This is a patient with significant past medical history of CKD stage 5 who presented 12/19 with right groin pain x 1 day. He was found to have bilateral hernias with an SBO from an incarcerated right inguinal hernia. He went to the OR and underwent right inguinal hernia repair. Today he feels better and his pain is controlled. He follows with my partner Dr. Wang Song in the office, and of late has a baseline Cr around 3.5-3.7. His Cr on admission was 5.2, down to 4.9 today. No hematuria. He reports no leg swelling. Noted N/V x 1 day before admission, and his NG placed upon arrival to the ED yielded 1.2L. Past Medical History:        Diagnosis Date    Aortic aneurysm (HCC)     aortic stent in place    Arthritis     Cancer ([facility]     skin    CKD (chronic kidney disease) stage 5, GFR less than 15 ml/min ([facility])     DM Nephropathy, Dr. Wang Song patient    Diabetes mellitus ([facility]     Hyperlipidemia     Hypertension     Urinary problem     self cath tid    Wears glasses        Past Surgical History:        Procedure Laterality Date    CATARACT REMOVAL WITH IMPLANT  3/2012    LUNG BIOPSY      negative    OTHER SURGICAL HISTORY      aortic stent for aneurysm    TURP         Current Medications:    No current facility-administered medications on file prior to encounter.       Current Outpatient Medications on File Prior to Encounter   Medication Sig Dispense Refill    hydrALAZINE (APRESOLINE) 25 MG tablet Take 25 mg by mouth 3 times daily      furosemide (LASIX) 40 MG tablet Take 40 mg by mouth See Admin Instructions 3 times a week      aspirin 81 MG EC tablet Take 1 tablet by mouth daily 30 tablet 0    insulin glargine (LANTUS SOLOSTAR) 100 UNIT/ML injection pen Inject 10 Units into the skin nightly  ferrous sulfate (IRON 325) 325 (65 Fe) MG tablet Take 325 mg by mouth daily      Cholecalciferol 50 MCG ( UT) CAPS Take 1 capsule by mouth daily      brimonidine (ALPHAGAN) 0.2 % ophthalmic solution Place 1 drop into the left eye 3 times daily      timolol (TIMOPTIC) 0.5 % ophthalmic solution Place 1 drop into the right eye 2 times daily      erythromycin (ROMYCIN) 5 MG/GM ophthalmic ointment Place 1 cm into the right eye nightly Lower eyelid      SITagliptin (JANUVIA) 25 MG tablet Take 25 mg by mouth daily      sodium bicarbonate 325 MG tablet Take 650 mg by mouth 3 times daily      Netarsudil-Latanoprost (ROCKLATAN) 0.02-0.005 % SOLN Apply 1 drop to eye daily      ondansetron (ZOFRAN) 4 MG tablet Take 1 tablet by mouth every 8 hours as needed for Nausea 10 tablet 0    lovastatin (MEVACOR) 20 MG tablet Take 20 mg by mouth daily.  Dorzolamide HCl-Timolol Mal (COSOPT OP) Apply 1 drop to eye 2 times daily. Both eyes       prednisoLONE acetate (PRED FORTE) 1 % ophthalmic suspension Place 1 drop into the left eye 2 times daily.  atropine 1 % ophthalmic solution Place 1 drop into the left eye 2 times daily. Allergies:  No known allergies    Social History:    Social History     Socioeconomic History    Marital status:       Spouse name: Not on file    Number of children: Not on file    Years of education: Not on file    Highest education level: Not on file   Occupational History    Not on file   Social Needs    Financial resource strain: Not on file    Food insecurity     Worry: Not on file     Inability: Not on file    Transportation needs     Medical: Not on file     Non-medical: Not on file   Tobacco Use    Smoking status: Former Smoker     Quit date: 3/28/1991     Years since quittin.7    Smokeless tobacco: Never Used   Substance and Sexual Activity    Alcohol use: No    Drug use: No    Sexual activity: Not on file   Lifestyle    Physical activity  12/20/2020    CO2 22 12/20/2020    BUN 81 12/20/2020    LABALBU 3.8 12/19/2020    CREATININE 4.9 12/20/2020    CALCIUM 8.7 12/20/2020    GFRAA 13 12/20/2020    GFRAA 46 08/10/2012    LABGLOM 11 12/20/2020    GLUCOSE 155 12/20/2020       IMPRESSION/RECOMMENDATIONS:      1. ALE on CKD stage 5: He follows with my partner Dr. Checo Singer. At the last visit 4 days ago, she did discuss his overall goals of care, and he does not want hemodialysis. His Cr is a bit better today, his baseline is a Cr of 3.5-3.7. His CKD is thought to be due to diabetic nephropathy. Continue IV fluids and supportive care. ALE likely from volume depletion in the setting of his SBO and home diuretics. He is making some urine. 2. SBO from incarcerated right inguinal hernia: S/p repair last night. Doing better. 3. Anemia of CKD: Hgb down a bit overnight, likely he was hemoconcentrated as his hgb 11/24 and 12/8 are closer to his range today. Trend out - no indication for epo. He is on iron as an outpatient. 4. Hx of hyperkalemia: Off losartan d/t this. Previously on veltassa but has been off for some time. 5. Metabolic acidosis: CO2 at goal.  On oral bicarb as an outpatient. 6. Proteinuria: Typically on lasix 3x/week for volume control with his proteinuria. Holding now, watch fluid status. Thank you for allowing me to participate in the care of this patient. I will continue to follow along. Please call with questions.     Papi Fuentes MD

## 2020-12-20 NOTE — H&P
Musculoskeletal: Negative for myalgias and arthralgias  Neurologic: Negative for confusion,dysarthria. Skin: Negative for itching,rash  Psychiatric: Negative for depression,anxiety, agitation. Endocrine: Negative for polydipsia,polyuria,heat /cold intolerance. Past Medical History:   has a past medical history of Aortic aneurysm ([facility] Arthritis, Cancer ([facility] CKD (chronic kidney disease) stage 5, GFR less than 15 ml/min ([facility] Diabetes mellitus ([facility] Hyperlipidemia, Hypertension, Urinary problem, and Wears glasses. Past Surgical History:   has a past surgical history that includes TURP; Lung biopsy; other surgical history; and Cataract removal with implant (3/2012). Medications:  No current facility-administered medications on file prior to encounter.       Current Outpatient Medications on File Prior to Encounter   Medication Sig Dispense Refill    hydrALAZINE (APRESOLINE) 25 MG tablet Take 25 mg by mouth 3 times daily      furosemide (LASIX) 40 MG tablet Take 40 mg by mouth See Admin Instructions 3 times a week      aspirin 81 MG EC tablet Take 1 tablet by mouth daily 30 tablet 0    insulin glargine (LANTUS SOLOSTAR) 100 UNIT/ML injection pen Inject 10 Units into the skin nightly      ferrous sulfate (IRON 325) 325 (65 Fe) MG tablet Take 325 mg by mouth daily      Cholecalciferol 50 MCG (2000 UT) CAPS Take 1 capsule by mouth daily      brimonidine (ALPHAGAN) 0.2 % ophthalmic solution Place 1 drop into the left eye 3 times daily      timolol (TIMOPTIC) 0.5 % ophthalmic solution Place 1 drop into the right eye 2 times daily      erythromycin (ROMYCIN) 5 MG/GM ophthalmic ointment Place 1 cm into the right eye nightly Lower eyelid      SITagliptin (JANUVIA) 25 MG tablet Take 25 mg by mouth daily      sodium bicarbonate 325 MG tablet Take 650 mg by mouth 3 times daily      Netarsudil-Latanoprost (ROCKLATAN) 0.02-0.005 % SOLN Apply 1 drop to eye daily  ondansetron (ZOFRAN) 4 MG tablet Take 1 tablet by mouth every 8 hours as needed for Nausea 10 tablet 0    lovastatin (MEVACOR) 20 MG tablet Take 20 mg by mouth daily.  Dorzolamide HCl-Timolol Mal (COSOPT OP) Apply 1 drop to eye 2 times daily. Both eyes       prednisoLONE acetate (PRED FORTE) 1 % ophthalmic suspension Place 1 drop into the left eye 2 times daily.  atropine 1 % ophthalmic solution Place 1 drop into the left eye 2 times daily. Allergies: Allergies   Allergen Reactions    No Known Allergies         Social History:  Patient Lives  reports that he quit smoking about 29 years ago. He has never used smokeless tobacco. He reports that he does not drink alcohol or use drugs. Family History:  family history is not on file. Physical Exam:  BP (!) 166/75   Pulse 104   Temp 97.9 °F (36.6 °C) (Oral)   Resp 16   Ht 6' 4\" (1.93 m)   Wt 185 lb (83.9 kg)   SpO2 91%   BMI 22.52 kg/m²     General appearance:  Appears uncomfortable. Well nourished  Eyes: Sclera clear, pupils equal  ENT: Moist mucus membranes, no thrush. Trachea midline. Cardiovascular: Regular rhythm, normal S1, S2. No murmur, gallop, rub. No edema in lower extremities  Respiratory: Clear to auscultation bilaterally but diminished  Gastrointestinal: Abdomen soft with RLQ guarding and tenderness  Musculoskeletal: No cyanosis in digits, neck supple  Neurology: Cranial nerves grossly intact. Alert and oriented in time, place and person. No speech or motor deficits  Psychiatry: Appropriate affect.  Not agitated  Skin: Warm, dry, normal turgor, no rash  Brisk capillary refill, peripheral pulses palpable   Labs:  CBC:   Lab Results   Component Value Date    WBC 11.0 12/19/2020    RBC 4.38 12/19/2020    HGB 12.7 12/19/2020    HCT 38.3 12/19/2020    MCV 87.5 12/19/2020    MCH 29.1 12/19/2020    MCHC 33.2 12/19/2020    RDW 14.1 12/19/2020     12/19/2020    MPV 8.8 12/19/2020     BMP:    Lab Results Component Value Date     12/19/2020    K 4.4 12/19/2020    CL 99 12/19/2020    CO2 26 12/19/2020    BUN 84 12/19/2020    CREATININE 5.2 12/19/2020    CALCIUM 10.2 12/19/2020    GFRAA 13 12/19/2020    GFRAA 46 08/10/2012    LABGLOM 10 12/19/2020    GLUCOSE 188 12/19/2020     XR ABDOMEN (KUB) (SINGLE AP VIEW)   Final Result   Enteric catheter proximal port at the GE junction. Advance catheter 3-5 cm. CT ABDOMEN PELVIS WO CONTRAST Additional Contrast? None   Final Result   High-grade distal small-bowel obstruction, with apparent transition within a   right inguinal hernia. Left inguinal hernia with sigmoid colon and collapsed small bowel content. Aortoiliac endo stent is noted. Continued surveillance is recommended. Small pericardial and small to moderate-sized bilateral pleural effusions. Bibasilar atelectasis. Pneumonia less likely. XR CHEST PORTABLE   Final Result   Questionable retrocardiac infiltrate. Chest Xray:   EKG:      Ventricular Rate 101 P BPM QTc Calculation (Bazett) 479 P ms   Atrial Rate 100 P BPM R [place] -33 P degrees   QRS Duration 92 P ms T Axis 87 P degrees   Q-T Interval 370 P ms Diagnosis Atrial fibrillation with rapid ventricular response       I visualized EKG strip and interpreted it as NSR with occasional PVCs; atrial fibrillation not appreciated    Discussed case  With ED provider    Problem List  Principal Problem:    Incarcerated right inguinal hernia  Active Problems:    Diabetes mellitus ([address]    Hypertension    Stage 5 chronic kidney disease not on chronic dialysis ([address]    Hyperlipidemia  Resolved Problems:    * No resolved hospital problems.  *        Assessment/Plan:     Incarcerated right inguinal hernia with bowel obstruction  Appreciate GI recommendations and anticipate evening surgical intervention  Preoperative labs, UA, EKG ordered and reviewed Emergent nature of situation precludes detailed surgical clearance; benefits outweigh risks despite underlying comorbidities  Patient n.p.o. until further notice with all PTA oral medications placed on hold  IV Dilaudid scheduled as needed pain as preferred agent in the setting of CKD    Type II DM  A1c pending  Oral hypoglycemic agents held  Strict n.p.o. status therefore recommend Humalog SSI coverage only    Stage V CKD  Avoid nephrotoxic agents when possible  Consultation placed to nephro for further recommendations given acute lab abnormalities    HTN  Oral hydralazine replaced with IV due to n.p.o. status      DVT prophylaxisBLE SCD due to surgical intervention  Code statusDNR CCA  Dietstrict n.p.o. until advanced by surgery  IV accessPIV established in ED      Admit as inpatient. I anticipate hospitalization spanning more than two midnights for investigation and treatment of the above medically necessary diagnoses. Please note that some part of this chart was generated using Dragon dictation software. Although every effort was made to ensure the accuracy of this automated transcription, some errors in transcription may have occurred inadvertently. If you may need any clarification, please do not hesitate to contact me through [facility].        Vel Francis MD    12/20/2020 3:10 AM

## 2020-12-20 NOTE — ANESTHESIA POSTPROCEDURE EVALUATION
Department of Anesthesiology  Postprocedure Note    Patient: Gustabo Kirkpatrick  MRN: [id number]  YOB: 1927  Date of evaluation: 12/19/2020  Time:  11:13 PM     Procedure Summary     Date: 12/19/20 Room / Location: [address]    Anesthesia Start: 2112 Anesthesia Stop: 2231    Procedure: HERNIA INGUINAL REPAIR (Right Groin) Diagnosis: (-)    Surgeons: Kolton Erwin MD Responsible Provider: Majo Gupta MD    Anesthesia Type: general ASA Status: 3 - Emergent          Anesthesia Type: No value filed. Darshana Phase I: Darshana Score: 10    Darshana Phase II:      Last vitals: Reviewed and per EMR flowsheets. Anesthesia Post Evaluation    Patient location during evaluation: PACU  Patient participation: complete - patient participated  Level of consciousness: awake and alert  Airway patency: patent  Nausea & Vomiting: no nausea and no vomiting  Complications: no  Cardiovascular status: blood pressure returned to baseline  Respiratory status: acceptable  Hydration status: euvolemic  Comments: VSS on transfer to phase 2 recovery. No anesthetic complications.

## 2020-12-20 NOTE — PROGRESS NOTES
[facility] GENERAL SURGERY DAILY PROGRESS NOTE    SUBJECTIVE: Awake, alert    OBJECTIVE: CURRENT VITALS:  BP (!) 159/83   Pulse 91   Temp 98.4 °F (36.9 °C) (Oral)   Resp 16   Ht 6' 4\" (1.93 m)   Wt 183 lb 3.2 oz (83.1 kg)   SpO2 97%   BMI 22.30 kg/m²          ABD: Soft. Non distended. Non tender. LABS:    CBC:   Recent Labs     12/19/20  1354 12/20/20  1038   WBC 11.0 8.6   RBC 4.38 3.80*   HGB 12.7* 10.9*   HCT 38.3* 33.3*   MCV 87.5 87.8   RDW 14.1 14.2    150     BMP:   Recent Labs     12/19/20  1354 12/20/20  0552    142   K 4.4 4.7   CL 99 107   CO2 26 22   PHOS  --  6.8*   BUN 84* 81*   CREATININE 5.2* 4.9*     Recent Labs     12/20/20  0552   MG 1.70*         ASSESSMENT:   POD 1 repair incarcerated [facility]      PLAN:   D/C   Place Blood since patient chronic straight cath at home. OOB  IS  Clear liquids slowly since having BMs now.           [address] [facility]

## 2020-12-20 NOTE — PROGRESS NOTES
Patient arrived to room 321 via bed. Patient oriented to room and to use of call light system.  Will continue to monitor situation

## 2020-12-20 NOTE — PROGRESS NOTES
Patient assessment complete and documented. VSS. A&O x4. Patient has NG in place to CLWS. Patient has had multiple bowel movements since arriving to the unit. Patient has a lower right abd incision under foam no drainage noted, no complaints of pain at this time. Bed is in lowest locked position with call light within reach.  Will continue to monitor situation

## 2020-12-20 NOTE — CONSULTS
[facility]    HPI:  Patient is 80y.o. year old male seen at request of Hospitalist service. He reports pain in right groin. It is pressure-like. It is described as severe. Other associated symptoms are nausea and vomiting. These symptoms have been present for 1 day. He has known BIH for many years but never had symptoms like these. The pain seems to have started with an unknown event. The pain does not radiate to the back. He   admits to seeing a bulge. NG placed and returned 1300 cc. Past Medical History:   Diagnosis Date    Aortic aneurysm (HCC)     aortic stent in place    Arthritis     Cancer ([facility]     skin    CKD (chronic kidney disease) stage 5, GFR less than 15 ml/min ([facility])     DM Nephropathy, Dr. David Cassidy patient    Diabetes [facility]     Hyperlipidemia     Hypertension     Urinary problem     self cath tid    Wears glasses        Past Surgical History:   Procedure Laterality Date    CATARACT REMOVAL WITH IMPLANT  3/2012    LUNG BIOPSY      negative    OTHER SURGICAL HISTORY      aortic stent for aneurysm    TURP         No current facility-administered medications on file prior to encounter.       Current Outpatient Medications on File Prior to Encounter   Medication Sig Dispense Refill    hydrALAZINE (APRESOLINE) 25 MG tablet Take 25 mg by mouth 3 times daily      furosemide (LASIX) 40 MG tablet Take 40 mg by mouth See Admin Instructions 3 times a week      aspirin 81 MG EC tablet Take 1 tablet by mouth daily 30 tablet 0    insulin glargine (LANTUS SOLOSTAR) 100 UNIT/ML injection pen Inject 10 Units into the skin nightly      ferrous sulfate (IRON 325) 325 (65 Fe) MG tablet Take 325 mg by mouth daily      Cholecalciferol 50 MCG (2000 UT) CAPS Take 1 capsule by mouth daily      brimonidine (ALPHAGAN) 0.2 % ophthalmic solution Place 1 drop into the left eye 3 times daily  timolol (TIMOPTIC) 0.5 % ophthalmic solution Place 1 drop into the right eye 2 times daily      erythromycin (ROMYCIN) 5 MG/GM ophthalmic ointment Place 1 cm into the right eye nightly Lower eyelid      SITagliptin (JANUVIA) 25 MG tablet Take 25 mg by mouth daily      sodium bicarbonate 325 MG tablet Take 650 mg by mouth 3 times daily      Netarsudil-Latanoprost (ROCKLATAN) 0.02-0.005 % SOLN Apply 1 drop to eye daily      ondansetron (ZOFRAN) 4 MG tablet Take 1 tablet by mouth every 8 hours as needed for Nausea 10 tablet 0    lovastatin (MEVACOR) 20 MG tablet Take 20 mg by mouth daily.  Dorzolamide HCl-Timolol Mal (COSOPT OP) Apply 1 drop to eye 2 times daily. Both eyes       prednisoLONE acetate (PRED FORTE) 1 % ophthalmic suspension Place 1 drop into the left eye 2 times daily.  atropine 1 % ophthalmic solution Place 1 drop into the left eye 2 times daily. Allergies   Allergen Reactions    No Known Allergies        Social History     Socioeconomic History    Marital status:       Spouse name: Not on file    Number of children: Not on file    Years of education: Not on file    Highest education level: Not on file   Occupational History    Not on file   Social Needs    Financial resource strain: Not on file    Food insecurity     Worry: Not on file     Inability: Not on file    Transportation needs     Medical: Not on file     Non-medical: Not on file   Tobacco Use    Smoking status: Former Smoker     Quit date: 3/28/1991     Years since quittin.7    Smokeless tobacco: Never Used   Substance and Sexual Activity    Alcohol use: No    Drug use: No    Sexual activity: Not on file   Lifestyle    Physical activity     Days per week: Not on file     Minutes per session: Not on file    Stress: Not on file   Relationships    Social connections     Talks on phone: Not on file     Gets together: Not on file     Attends Evangelical service: Not on file Active member of club or organization: Not on file     Attends meetings of clubs or organizations: Not on file     Relationship status: Not on file    Intimate partner violence     Fear of current or ex partner: Not on file     Emotionally abused: Not on file     Physically abused: Not on file     Forced sexual activity: Not on file   Other Topics Concern    Not on file   Social History Narrative    Not on file       History reviewed. No pertinent family history. ROS:  He reports no complaints related to the eyes, ears , nose throat or mouth. He denies weight loss. No chest pain. No SOB. No urinary complaints. No musculoskeletal complaints. No skin rashes. No neurologic deficits. No bleeding tendencies. GI complaints include N/V and abdominal pain. Physical Exam:  Vitals:    12/19/20 1925   BP: (!) 161/74   Pulse: 104   Resp: 22   Temp:    SpO2: 96%     General:  Comfortable. No distress. Eyes:  No scleral icterus  Ears:  Normal  Nose:  Normal  Mouth:  Mucous membranes moist  Respiratory: Lungs CTA. No accessory muscle use. Heart:  Regular rhythm  Abdomen:  Soft. Non distended. Tender R groin. RIH that I cannot reduce. Musculoskeletal:  No abnormal movements. ROM extremities normal.  Skin:  No rashes. Neurologic:  No focal deficits. Psychiatric:  AAA. O x 3.    Radiographic studies:  CT with BI with SBO from incarcerated small bowel on the right side. ASSESSMENT:  Incarcerated RIH  SBO  ALE  LIH that is not acute  DM      PLAN:  The diagnosis and recommended procedure were explained. Questions answered. Prepare for hernia surgery. Will repair R side that is incarcerated and requires urgent attention. L side will be dealt with electively when he is not acutely ill and compromised. Greater than 50% visit spent counseling about diagnosis, treatment plan and expected post operative course.         [address] [facility]

## 2020-12-20 NOTE — BRIEF OP NOTE
Brief Postoperative Note      Patient: Madie Shen  YOB: 1927  MRN: [id number]    Date of Procedure: 12/19/2020    Pre-Op Diagnosis: -Incarcerated RIH    Post-Op Diagnosis: Same       Procedure(s): HERNIA INGUINAL REPAIR Right side with mesh    Surgeon(s):  Michelle Porras MD    Assistant:  Surgical Assistant: Tayo Alcaraz    Anesthesia: General    Estimated Blood Loss (mL): Minimal    Complications: None    Specimens:   * No specimens in log *    Implants:  Implant Name Type Inv. Item Serial No.  Lot No. LRB No. Used Action   MESH ART SM [id number] POLYPR PRESHAPED W/ KEYHOLE PROL  MESH ART SM [id number] POLYPR PRESHAPED W/ KEYHOLE PROL  [employer]- [id number] Right 1 Implanted         Drains:   NG/OG/NJ/NE Tube Nasogastric 16 fr Right nostril (Active)   Surrounding Skin Dry; Intact 12/19/20 1637   Securement device Yes 12/19/20 1637   Status Suction-low intermittent 12/19/20 1637   Placement Verified by X-Ray (Initial) 12/19/20 1637       Findings: As above    Electronically signed by Bruno Vogel MD on 12/19/2020 at 10:06 PM

## 2020-12-20 NOTE — PROGRESS NOTES
Pt alert with slight confusion; vss; assessment complete and charted; straight cath pt; no needs or complaints; call light in reach; will monitor

## 2020-12-21 LAB
ANION GAP SERPL CALCULATED.3IONS-SCNC: 12 MMOL/L (ref 3–16)
BASOPHILS ABSOLUTE: 0.1 K/UL (ref 0–0.2)
BASOPHILS RELATIVE PERCENT: 0.7 %
BUN BLDV-MCNC: 83 MG/DL (ref 7–20)
CALCIUM SERPL-MCNC: 8.5 MG/DL (ref 8.3–10.6)
CHLORIDE BLD-SCNC: 105 MMOL/L (ref 99–110)
CO2: 20 MMOL/L (ref 21–32)
CREAT SERPL-MCNC: 4.4 MG/DL (ref 0.8–1.3)
EOSINOPHILS ABSOLUTE: 0.2 K/UL (ref 0–0.6)
EOSINOPHILS RELATIVE PERCENT: 2.1 %
GFR AFRICAN AMERICAN: 15
GFR NON-AFRICAN AMERICAN: 13
GLUCOSE BLD-MCNC: 112 MG/DL (ref 70–99)
GLUCOSE BLD-MCNC: 146 MG/DL (ref 70–99)
GLUCOSE BLD-MCNC: 159 MG/DL (ref 70–99)
GLUCOSE BLD-MCNC: 161 MG/DL (ref 70–99)
GLUCOSE BLD-MCNC: 191 MG/DL (ref 70–99)
GLUCOSE BLD-MCNC: 230 MG/DL (ref 70–99)
GLUCOSE BLD-MCNC: 77 MG/DL (ref 70–99)
HCT VFR BLD CALC: 30.3 % (ref 40.5–52.5)
HEMOGLOBIN: 10.1 G/DL (ref 13.5–17.5)
LYMPHOCYTES ABSOLUTE: 1.8 K/UL (ref 1–5.1)
LYMPHOCYTES RELATIVE PERCENT: 20 %
MCH RBC QN AUTO: 29.2 PG (ref 26–34)
MCHC RBC AUTO-ENTMCNC: 33.4 G/DL (ref 31–36)
MCV RBC AUTO: 87.4 FL (ref 80–100)
MONOCYTES ABSOLUTE: 1.4 K/UL (ref 0–1.3)
MONOCYTES RELATIVE PERCENT: 15.4 %
NEUTROPHILS ABSOLUTE: 5.7 K/UL (ref 1.7–7.7)
NEUTROPHILS RELATIVE PERCENT: 61.8 %
PDW BLD-RTO: 14.3 % (ref 12.4–15.4)
PERFORMED ON: ABNORMAL
PERFORMED ON: NORMAL
PHOSPHORUS: 5.6 MG/DL (ref 2.5–4.9)
PLATELET # BLD: 146 K/UL (ref 135–450)
PMV BLD AUTO: 8.4 FL (ref 5–10.5)
POTASSIUM REFLEX MAGNESIUM: 4.4 MMOL/L (ref 3.5–5.1)
RBC # BLD: 3.47 M/UL (ref 4.2–5.9)
SODIUM BLD-SCNC: 137 MMOL/L (ref 136–145)
WBC # BLD: 9.2 K/UL (ref 4–11)

## 2020-12-21 PROCEDURE — 2500000003 HC RX 250 WO HCPCS: Performed by: SURGERY

## 2020-12-21 PROCEDURE — 99024 POSTOP FOLLOW-UP VISIT: CPT | Performed by: SURGERY

## 2020-12-21 PROCEDURE — 85025 COMPLETE CBC W/AUTO DIFF WBC: CPT

## 2020-12-21 PROCEDURE — 1200000000 HC SEMI PRIVATE

## 2020-12-21 PROCEDURE — 6370000000 HC RX 637 (ALT 250 FOR IP): Performed by: NURSE PRACTITIONER

## 2020-12-21 PROCEDURE — 36415 COLL VENOUS BLD VENIPUNCTURE: CPT

## 2020-12-21 PROCEDURE — 97162 PT EVAL MOD COMPLEX 30 MIN: CPT

## 2020-12-21 PROCEDURE — 97535 SELF CARE MNGMENT TRAINING: CPT

## 2020-12-21 PROCEDURE — 97166 OT EVAL MOD COMPLEX 45 MIN: CPT

## 2020-12-21 PROCEDURE — 84100 ASSAY OF PHOSPHORUS: CPT

## 2020-12-21 PROCEDURE — 6370000000 HC RX 637 (ALT 250 FOR IP): Performed by: HOSPITALIST

## 2020-12-21 PROCEDURE — 2500000003 HC RX 250 WO HCPCS: Performed by: NURSE PRACTITIONER

## 2020-12-21 PROCEDURE — 80048 BASIC METABOLIC PNL TOTAL CA: CPT

## 2020-12-21 PROCEDURE — 97116 GAIT TRAINING THERAPY: CPT

## 2020-12-21 PROCEDURE — 2580000003 HC RX 258: Performed by: NURSE PRACTITIONER

## 2020-12-21 RX ADMIN — SODIUM CHLORIDE: 9 INJECTION, SOLUTION INTRAVENOUS at 20:44

## 2020-12-21 RX ADMIN — INSULIN LISPRO 2 UNITS: 100 INJECTION, SOLUTION INTRAVENOUS; SUBCUTANEOUS at 20:55

## 2020-12-21 RX ADMIN — BRIMONIDINE TARTRATE 1 DROP: 2 SOLUTION OPHTHALMIC at 20:46

## 2020-12-21 RX ADMIN — FAMOTIDINE 20 MG: 10 INJECTION INTRAVENOUS at 08:54

## 2020-12-21 RX ADMIN — PREDNISOLONE ACETATE 1 DROP: 10 SUSPENSION/ DROPS OPHTHALMIC at 08:57

## 2020-12-21 RX ADMIN — ATROPINE SULFATE 1 DROP: 10 SOLUTION OPHTHALMIC at 20:54

## 2020-12-21 RX ADMIN — ERYTHROMYCIN 1 CM: 5 OINTMENT OPHTHALMIC at 20:46

## 2020-12-21 RX ADMIN — TIMOLOL MALEATE 1 DROP: 5 SOLUTION/ DROPS OPHTHALMIC at 20:46

## 2020-12-21 RX ADMIN — BRIMONIDINE TARTRATE 1 DROP: 2 SOLUTION OPHTHALMIC at 15:23

## 2020-12-21 RX ADMIN — LABETALOL HYDROCHLORIDE 10 MG: 5 INJECTION INTRAVENOUS at 08:55

## 2020-12-21 RX ADMIN — INSULIN LISPRO 2 UNITS: 100 INJECTION, SOLUTION INTRAVENOUS; SUBCUTANEOUS at 09:00

## 2020-12-21 RX ADMIN — HYDRALAZINE HYDROCHLORIDE 25 MG: 25 TABLET, FILM COATED ORAL at 20:46

## 2020-12-21 RX ADMIN — INSULIN LISPRO 4 UNITS: 100 INJECTION, SOLUTION INTRAVENOUS; SUBCUTANEOUS at 11:26

## 2020-12-21 RX ADMIN — TIMOLOL MALEATE 1 DROP: 5 SOLUTION/ DROPS OPHTHALMIC at 08:59

## 2020-12-21 RX ADMIN — HYDRALAZINE HYDROCHLORIDE 25 MG: 25 TABLET, FILM COATED ORAL at 15:23

## 2020-12-21 RX ADMIN — HYDRALAZINE HYDROCHLORIDE 25 MG: 25 TABLET, FILM COATED ORAL at 08:54

## 2020-12-21 RX ADMIN — PREDNISOLONE ACETATE 1 DROP: 10 SUSPENSION/ DROPS OPHTHALMIC at 20:46

## 2020-12-21 RX ADMIN — BRIMONIDINE TARTRATE 1 DROP: 2 SOLUTION OPHTHALMIC at 08:54

## 2020-12-21 NOTE — PROGRESS NOTES
Hospitalist Progress Note      PCP: Chyna Caceres MD    Date of Admission: 12/19/2020    Chief Complaint:   Abdominal Pain        lower starred last night felt worse this morning N/V         Hospital Course: 79 yo male admitted with SBO and incarcerated inguinal hernia taken to surgery for repair. Recovering from procedure well     Subjective: EMR and notes reviewed pt seen and examined, up in chair, pain controlled, passing flatus, on clears tolerating well       Medications:  Reviewed    Infusion Medications    sodium chloride 50 mL/hr at 12/20/20 1551    dextrose       Scheduled Medications    hydrALAZINE  25 mg Oral TID    Netarsudil-Latanoprost  1 drop Right Eye QPM    atropine  1 drop Left Eye BID    brimonidine  1 drop Left Eye TID    erythromycin  1 cm Right Eye Nightly    prednisoLONE acetate  1 drop Left Eye BID    timolol  1 drop Right Eye BID    insulin lispro  0-12 Units Subcutaneous Q4H    famotidine (PEPCID) injection  20 mg Intravenous Daily     PRN Meds: oxyCODONE **OR** oxyCODONE, labetalol, glucose, dextrose, glucagon (rDNA), dextrose, promethazine **OR** ondansetron, acetaminophen **OR** acetaminophen      Intake/Output Summary (Last 24 hours) at 12/21/2020 1217  Last data filed at 12/21/2020 1056  Gross per 24 hour   Intake 3194 ml   Output 775 ml   Net 2419 ml       Physical Exam Performed:    BP (!) 179/81   Pulse 82   Temp 97.6 °F (36.4 °C) (Oral)   Resp 16   Ht 6' 4\" (1.93 m)   Wt 184 lb 1.4 oz (83.5 kg)   SpO2 98%   BMI 22.41 kg/m²     General appearance: No apparent distress, appears stated age and cooperative. HEENT: Pupils equal, round, and reactive to light. Conjunctivae/corneas clear. Neck: Supple, with full range of motion. No jugular venous distention. Trachea midline. Respiratory:  Normal respiratory effort. Clear to auscultation, bilaterally without Rales/Wheezes/Rhonchi. Cardiovascular: Regular rate and rhythm with normal S1/S2 without murmurs, rubs or gallops. Abdomen: Soft, non-tender, non-distended with normal bowel sounds. Incision intact   Musculoskeletal: No clubbing, cyanosis or edema bilaterally. Full range of motion without deformity. Skin: Skin color, texture, turgor normal.  No rashes or lesions. Neurologic:  Neurovascularly intact without any focal sensory/motor deficits. Cranial nerves: II-XII intact, grossly non-focal.  Psychiatric: Alert and oriented, thought content appropriate, normal insight  Capillary Refill: Brisk,< 3 seconds   Peripheral Pulses: +2 palpable, equal bilaterally       Labs:   Recent Labs     12/19/20  1354 12/20/20  1038 12/21/20  0533   WBC 11.0 8.6 9.2   HGB 12.7* 10.9* 10.1*   HCT 38.3* 33.3* 30.3*    150 146     Recent Labs     12/19/20  1354 12/20/20  0552 12/21/20  0533    142 137   K 4.4 4.7 4.4   CL 99 107 105   CO2 26 22 20*   BUN 84* 81* 83*   CREATININE 5.2* 4.9* 4.4*   CALCIUM 10.2 8.7 8.5   PHOS  --  6.8* 5.6*     Recent Labs     12/19/20  1354   AST 19   ALT 10   BILITOT 0.3   ALKPHOS 100     Recent Labs     12/20/20  0013   INR 1.06     Recent Labs     12/19/20  1354   TROPONINI 0.06*       Urinalysis:      Lab Results   Component Value Date    NITRU Negative 12/20/2020    WBCUA 21-50 12/20/2020    BACTERIA Rare 12/20/2020    RBCUA 3-4 12/20/2020    BLOODU TRACE-INTACT 12/20/2020    SPECGRAV 1.025 12/20/2020    GLUCOSEU 100 12/20/2020       Radiology:  XR ABDOMEN (KUB) (SINGLE AP VIEW)   Final Result   Enteric catheter proximal port at the GE junction. Advance catheter 3-5 cm. CT ABDOMEN PELVIS WO CONTRAST Additional Contrast? None   Final Result   High-grade distal small-bowel obstruction, with apparent transition within a   right inguinal hernia. Left inguinal hernia with sigmoid colon and collapsed small bowel content. Aortoiliac endo stent is noted. Continued surveillance is recommended. Small pericardial and small to moderate-sized bilateral pleural effusions. Bibasilar atelectasis. Pneumonia less likely. XR CHEST PORTABLE   Final Result   Questionable retrocardiac infiltrate.                  Assessment/Plan:    Active Hospital Problems    Diagnosis    Incarcerated right inguinal hernia [K40.30]    Stage 5 chronic kidney disease not on chronic dialysis ([facility] [N18.5]    Hypertension [I10]    Hyperlipidemia [E78.5]    Diabetes mellitus ([facility] [E11.9]     Abd pain in the setting of incarcerated right inguinal hernia and bowel obstruction   - GS consulted is S/P repair, bowel function returning, diet per GS   - pain mgt, ambulate IS     Stage V CKD  Avoid nephrotoxic agents when possible  Consultation placed to nephro for further recommendations given lab abnormality      HTN  Oral hydralazine replaced with IV back to oral now that taking PO     HLD- add back statin when tolerating PO             DVT Prophylaxis: scd's  Diet: DIET FULL LIQUID;  Code Status: DNR-CCA    PT/OT Eval Status: ordered and rec's reviewed for home care, orders placed     Dispo - Pending GS 1-2 days     BRIAN Harris - CNP

## 2020-12-21 NOTE — PROGRESS NOTES
NEPHROLOGY PROGRESS NOTE    CC: ALE, CKD  HPI  This is a patient with significant past medical history of CKD stage 5 who presented 12/19 with right groin pain x 1 day. He was found to have bilateral hernias with an SBO from an incarcerated right inguinal hernia. He went to the OR and underwent right inguinal hernia repair  He follows with my partner Dr. Susy Cobb in the office, and of late has a baseline Cr around 3.5-3.7. His Cr on admission was 5.2    Creatinine trending down, at 4.4 today    SUBJECTIVE    States he feels fine, a bit tired but no other new c/o. Specifically , no CP or SOB, no N/V  SOC: no visitors        Scheduled Meds:   hydrALAZINE  25 mg Oral TID    Netarsudil-Latanoprost  1 drop Right Eye QPM    atropine  1 drop Left Eye BID    brimonidine  1 drop Left Eye TID    erythromycin  1 cm Right Eye Nightly    prednisoLONE acetate  1 drop Left Eye BID    timolol  1 drop Right Eye BID    insulin lispro  0-12 Units Subcutaneous Q4H    famotidine (PEPCID) injection  20 mg Intravenous Daily     Continuous Infusions:   sodium chloride 50 mL/hr at 12/20/20 1551    dextrose       PRN Meds:oxyCODONE **OR** oxyCODONE, labetalol, glucose, dextrose, glucagon (rDNA), dextrose, promethazine **OR** ondansetron, acetaminophen **OR** acetaminophen      Objective:      Physical Exam  Wt Readings from Last 3 Encounters:   12/21/20 184 lb 1.4 oz (83.5 kg)   11/10/20 182 lb (82.6 kg)   11/03/20 180 lb (81.6 kg)     Temp Readings from Last 3 Encounters:   12/20/20 98.7 °F (37.1 °C) (Oral)   11/24/20 97.2 °F (36.2 °C) (Temporal)   11/10/20 96.5 °F (35.8 °C) (Temporal)     BP Readings from Last 3 Encounters:   12/20/20 (!) 176/82   12/19/20 138/64   11/24/20 (!) 152/78     Pulse Readings from Last 3 Encounters:   12/20/20 82   11/24/20 67   11/10/20 81   Physical Exam:  Gen: Resting in bed, NAD. HEENT: MMM, OP clear. CV: RRR no m/r. No S3.  Lungs: Clear bilaterally. No rhonchi. 4. Hx of hyperkalemia: Off losartan d/t this. Previously on veltassa but has been off for some time.     5. Metabolic acidosis: CO2 at goal.  On oral bicarb as an outpatient.     6. Proteinuria: Typically on lasix 3x/week for volume control with his proteinuria. Holding now, watch fluid status.

## 2020-12-21 NOTE — PROGRESS NOTES
[facility] GENERAL SURGERY    Surgery Progress Note           POD # 2    PATIENT NAME: Eun Campos     TODAY'S DATE: 12/21/2020    INTERVAL HISTORY:    Pt with mild pain, having flatus. OBJECTIVE:   VITALS:  BP (!) 202/95   Pulse 80   Temp 97.9 °F (36.6 °C) (Oral)   Resp 18   Ht 6' 4\" (1.93 m)   Wt 184 lb 1.4 oz (83.5 kg)   SpO2 92%   BMI 22.41 kg/m²     INTAKE/OUTPUT:    I/O last 3 completed shifts: In: 960 [P.O.:960]  Out: 1275 [Urine:1275]  I/O this shift:  In: 1994 [I.V.:1994]  Out: -               CONSTITUTIONAL:  awake and alert  LUNGS:  no crackles or wheezing  ABDOMEN:   normal bowel sounds, soft, non-distended, tender   INCISION: no drainage    Data:  CBC:   Recent Labs     12/19/20  1354 12/20/20  1038 12/21/20  0533   WBC 11.0 8.6 9.2   HGB 12.7* 10.9* 10.1*   HCT 38.3* 33.3* 30.3*    150 146     BMP:    Recent Labs     12/19/20  1354 12/20/20  0552 12/21/20  0533    142 137   K 4.4 4.7 4.4   CL 99 107 105   CO2 26 22 20*   BUN 84* 81* 83*   CREATININE 5.2* 4.9* 4.4*   GLUCOSE 188* 155* 161*     Hepatic:   Recent Labs     12/19/20  1354   AST 19   ALT 10   BILITOT 0.3   ALKPHOS 100     Mag:      Recent Labs     12/20/20  0552   MG 1.70*      Phos:     Recent Labs     12/20/20  0552 12/21/20  0533   PHOS 6.8* 5.6*      INR:   Recent Labs     12/20/20  0013   INR 1.06         Radiology Review:  NA    ASSESSMENT AND PLAN:  80 y.o. male status post right inguinal hernia repair  1. Full liquid diet  2.  PO pain control  3.   Increase ambulation         Electronically signed by Chu Tapia MD

## 2020-12-21 NOTE — PROGRESS NOTES
Pt A&Ox4 with intermittent forgetfulness. BP high this AM- labetalol administered per orders. Shift assessment completed. Pt denies pain at this time. Currently up to the chair. Call light within reach, bed in lowest position, wheels locked. Alarm on and audible. Will monitor.

## 2020-12-21 NOTE — PROGRESS NOTES
Physical Therapy    Facility/Department: [facility] C3 TELE/MED SURG/ONC  Initial Assessment    NAME: Gustabo Kirkpatrick  : 10/17/1927  MRN: [id number]    Date of Service: 2020    Discharge Recommendations:  Home with Home health PT, 24 hour supervision or assist   PT Equipment Recommendations  Equipment Needed: No  Other: pt owns RW    Assessment   Body structures, Functions, Activity limitations: Decreased functional mobility ; Decreased balance;Decreased safe awareness;Decreased endurance;Decreased coordination  Assessment: Pt is 79 yo male who presents with diagnosis of Inguinal hernia with bowel obstruction; s/p HERNIA INGUINAL REPAIR Right side with mesh 2020. Pt mod I with mobility at baseline using SPC. Grossly CGA for mobility this date. Would benefit from use of RW at home to improve balance/stability. Pt would benefit from continued skilled therapy to address deficits. Recommend home with 24-hr sup and home PT at d/c. Treatment Diagnosis: impaired functional mobility  Specific instructions for Next Treatment: progress mobility as tolerated  Prognosis: Good  Decision Making: Medium Complexity  PT Education: Goals; General Safety;Gait Training;PT Role;Disease Specific Education;Plan of Care; Functional Mobility Training;Transfer Training;Energy Conservation  Patient Education: Pt educated on importance of OOB mobility and sitting up in chair post surgery -- pt verbalized understanding  Barriers to Learning: none  REQUIRES PT FOLLOW UP: Yes  Activity Tolerance  Activity Tolerance: Patient Tolerated treatment well  Activity Tolerance: Seated EOB /70, HR 73; drop in BP noted in standing. Post gait training seated in chair /68, HR 79. Denies dizziness       Patient Diagnosis(es): The primary encounter diagnosis was Small bowel obstruction ([facility] [address] A diagnosis of ALE (acute kidney injury) ([facility] [address] was also pertinent to this visit. has a past medical history of Aortic aneurysm ([facility] [address] Arthritis, Cancer ([facility] [address] CKD (chronic kidney disease) stage 5, GFR less than 15 ml/min ([facility] [address] Diabetes mellitus ([facility] [address] Hyperlipidemia, Hypertension, Urinary problem, and Wears glasses. has a past surgical history that includes TURP; Lung biopsy; other surgical history; and Cataract removal with implant (3/2012).     Restrictions  Restrictions/Precautions  Restrictions/Precautions: General Precautions, Fall Risk  Vision/Hearing  Vision: Impaired  Vision Exceptions: Wears glasses at all times  Hearing: Within functional limits     Subjective  General  Chart Reviewed: Yes  Patient assessed for rehabilitation services?: Yes  Response To Previous Treatment: Not applicable  Family / Caregiver Present: No  Referring Practitioner: RALPH Lucio  Referral Date : 12/21/20  Diagnosis: Inguinal hernia with bowel obstruction; s/p HERNIA INGUINAL REPAIR Right side with mesh 12/19/2020  Follows Commands: Within Functional Limits  General Comment  Comments: Pt seated edge of bed on appproach; RN cleared pt for therapy  Subjective  Subjective: Pt agreeable to therapy  Pain Screening  Patient Currently in Pain: Denies    Orientation  Orientation  Overall Orientation Status: Within Functional Limits  Social/Functional History  Social/Functional History  Lives With: Son  Type of Home: House  Home Layout: Two level, Able to Live on Main level with bedroom/bathroom, Bed/Bath upstairs  Home Access: Stairs to enter with rails  Entrance Stairs - Number of Steps: 4  Entrance Stairs - Rails: Right  Bathroom Shower/Tub: Tub/Shower unit, Shower chair without back  Bathroom Toilet: Standard  Bathroom Equipment: Grab bars around toilet, Hand-held shower  Home Equipment: Cane, Rolling walker, Reacher  ADL Assistance: Independent  Homemaking Responsibilities: Yes  Ambulation Assistance: Independent  Transfer Assistance: Independent  Active : No  Occupation: Retired Type of occupation: fire department  Additional Comments: pt reports 1 recent fall at home    Objective     RLE AROM: WFL  LLE AROM : WFL  Strength RLE: WFL  Strength LLE: WFL        Bed mobility  Supine to Sit: Unable to assess  Sit to Supine: Unable to assess  Comment: pt seated at start and end of session     Transfers  Sit to Stand: Contact guard assistance  Stand to sit: Contact guard assistance     Ambulation  Ambulation?: Yes  Ambulation 1  Surface: level tile  Device: Single point cane  Assistance: Contact guard assistance  Quality of Gait: Mild unsteadiness with increased M/L sway but no overt LOB. Cues for posture and increased foot clearance bilaterally  Gait Deviations: Decreased step height;Decreased step length; Slow Soumya; Shuffles  Distance: 15 ft + 25 ft     Balance  Sitting - Static: Good  Sitting - Dynamic: Good  Standing - Static: Fair;+  Standing - Dynamic: [address]  Times per week: 3-5x/wk  Times per day: Daily  Specific instructions for Next Treatment: progress mobility as tolerated  Current Treatment Recommendations: Strengthening, Neuromuscular Re-education, Home Exercise Program, Safety Education & Training, Balance Training, Endurance Training, Functional Mobility Training, Transfer Training, Gait Training, Stair training, Equipment Evaluation, Education, & procurement, Patient/Caregiver Education & Training  Safety Devices  Type of devices:  All fall risk precautions in place, Call light within reach, Chair alarm in place, Gait belt, Patient at risk for falls, Nurse notified, Left in chair                                   AM-PAC Score  AM-PAC Inpatient Mobility Raw Score : 19 (12/21/20 1124)  AM-PAC Inpatient T-Scale Score : 45.44 (12/21/20 1124)  Mobility Inpatient CMS 0-100% Score: 41.77 (12/21/20 1124)  Mobility Inpatient CMS G-Code Modifier : CK (12/21/20 1124)          Goals  Short term goals  Time Frame for Short term goals: 1 week (12/28) unless otherwise specified Short term goal 1: Pt will be mod I with bed mobility. Short term goal 2: Pt will be supervision for transfers with LRAD. Short term goal 3: Pt will ambulate 50 ft with supervision and LRAD. Short term goal 4: Pt will negotiate 4 stairs with SBA and rails. Short term goal 5: 12/25: Pt will participate in 12-15 reps of BLE exercises to promote strength and activity tolerance. Patient Goals   Patient goals : \"to go home\"       Therapy Time   Individual Concurrent Group Co-treatment   Time In 6035         Time Out 0914         Minutes 20         Timed Code Treatment Minutes: [address] [facility], PT, DPT  If pt is unable to be seen after this session, please let this note serve as discharge summary. Please see case management note for discharge disposition. Thank you.

## 2020-12-21 NOTE — PROGRESS NOTES
Patient assessment complete and documented. VSS. A&O x4 with forgetfulness at times. Blood catheter in place and draining appropriately. Patient is currently resting in bed with no complaints noted at this time. Bed is in lowest locked position with call light within reach and bed alarm on.  Will continue to monitor

## 2020-12-21 NOTE — PROGRESS NOTES
Hospitalist Progress Note      PCP: Francesco Apodaca MD    Date of Admission: 12/19/2020    Chief Complaint:   Abdominal Pain        lower starred last night felt worse this morning N/V          Hospital Course:\" Desi Pulido is a 80 y.o. male who presented to the ED to be evaluated for sudden onset of lower abdominal pain ongoing for 1 day. He describes the pain as sharp and intense noting that it radiates downward. The patient also endorses nausea and vomiting but denies diarrhea. He has no history of prior abdominal surgeries but does self catheterize and has underlying stage V CKD. Patient also has known history of bilateral inguinal hernias for which she has never sought surgical intervention. CT scan of the abdomen pelvis was obtained revealing gastric distention, high-grade distal SBO with transition occurring at site of right inguinal hernia. Left inguinal hernia also identified with sigmoid colon and collapsed small bowel content. Labs were obtained and notable for acute worsening of his CKD, hyperglycemia, and nonspecific mild troponin elevation. Surgical team consulted, pt went to OR later that evening for repair of incarcerated RIH      Subjective: no acute issues overnight.  Feeling well tolerating PO , minimal pain       Medications:  Reviewed    Infusion Medications    sodium chloride 50 mL/hr at 12/20/20 1551    dextrose       Scheduled Medications    hydrALAZINE  25 mg Oral TID    Netarsudil-Latanoprost  1 drop Right Eye QPM    atropine  1 drop Left Eye BID    brimonidine  1 drop Left Eye TID    erythromycin  1 cm Right Eye Nightly    prednisoLONE acetate  1 drop Left Eye BID    timolol  1 drop Right Eye BID    insulin lispro  0-12 Units Subcutaneous Q4H    famotidine (PEPCID) injection  20 mg Intravenous Daily PRN Meds: oxyCODONE **OR** oxyCODONE, labetalol, glucose, dextrose, glucagon (rDNA), dextrose, promethazine **OR** ondansetron, acetaminophen **OR** acetaminophen      Intake/Output Summary (Last 24 hours) at 12/21/2020 0755  Last data filed at 12/21/2020 0308  Gross per 24 hour   Intake 960 ml   Output 1275 ml   Net -315 ml       Physical Exam Performed:    BP (!) 176/82   Pulse 82   Temp 98.7 °F (37.1 °C) (Oral)   Resp 18   Ht 6' 4\" (1.93 m)   Wt 184 lb 1.4 oz (83.5 kg)   SpO2 94%   BMI 22.41 kg/m²     General appearance: No apparent distress, appears stated age and cooperative. HEENT: Pupils equal, round, and reactive to light. Conjunctivae/corneas clear. Neck: Supple, with full range of motion. No jugular venous distention. Trachea midline. Respiratory:  Normal respiratory effort. Clear to auscultation, bilaterally without Rales/Wheezes/Rhonchi. Cardiovascular: Regular rate and rhythm with normal S1/S2 without murmurs, rubs or gallops. Abdomen: Soft, non-tender, non-distended with normal bowel sounds. Musculoskeletal: No clubbing, cyanosis or edema bilaterally. Full range of motion without deformity. Skin: Skin color, texture, turgor normal.  No rashes or lesions. Neurologic:  Neurovascularly intact without any focal sensory/motor deficits.  Cranial nerves: II-XII intact, grossly non-focal.  Psychiatric: Alert and oriented, thought content appropriate, normal insight  Capillary Refill: Brisk,< 3 seconds   Peripheral Pulses: +2 palpable, equal bilaterally       Labs:   Recent Labs     12/19/20  1354 12/20/20  1038 12/21/20  0533   WBC 11.0 8.6 9.2   HGB 12.7* 10.9* 10.1*   HCT 38.3* 33.3* 30.3*    150 146     Recent Labs     12/19/20  1354 12/20/20  0552 12/21/20  0533    142 137   K 4.4 4.7 4.4   CL 99 107 105   CO2 26 22 20*   BUN 84* 81* 83*   CREATININE 5.2* 4.9* 4.4*   CALCIUM 10.2 8.7 8.5   PHOS  --  6.8* 5.6*     Recent Labs     12/19/20  1354   AST 19   ALT 10   BILITOT 0.3 Diet: DIET CLEAR LIQUID;  Code Status: DNR-CCA  PT/OT Eval Status: ordered     Dispo - pending clinical course     BRIAN Estevez - CNP

## 2020-12-21 NOTE — CARE COORDINATION
CASE MANAGEMENT INITIAL ASSESSMENT    Reviewed chart and completed assessment with: Patient   Explained Case Management role/services. Primary contact information: [facility] Decision Maker :   Primary Decision Maker: Mac Cazares - [phone]    Secondary Decision Maker: Antolin Mas - [phone]          Can this person be reached and be able to respond quickly, such as within a few minutes or hours? Yes      Admit date/status:12/19/2020 Inpatient   Diagnosis:IncIncarcerated right inguinal hernia  Is this a Readmission?:  No      Insurance:Medicare   Precert required for SNF: No       3 night stay required: No    Living arrangements, Adls, care needs, prior to admission:Patient lives at home with son Mervyn Peabody. Marla helps with cooking/cleaning and meghan Noble helps with shopping and transportation     [address] supports need     Durable Medical Equipment at home:  Walker_x_Cane_x_RTS__ BSC__Shower Chair__  02__ HHN__ CPAP__  BiPap__  Hospital Bed__ W/C___ Other__________    Services in the home and/or outpatient, prior to admission: Prior use of [employer]     PT/OT recs:HOme with [place] [id number] Notification (HEN):NA    Barriers to discharge: Denies, increasing diet     Plan/comments: Patient plans to return home with family support ambulates with walker/ cane at baseline. Prior use of [facility] open to at dc if recs. SW will ct to follow.  BOGDAN Mccoy       ECOC on chart for MD signature

## 2020-12-21 NOTE — PROGRESS NOTES
Occupational Therapy   Occupational Therapy Initial Assessment/Treatment   Date: 2020   Patient Name: Ranjith Olivares  MRN: [id number]     : 10/17/1927    Date of Service: 2020    Discharge Recommendations:  24 hour supervision or assist, Home with Home health OT       Assessment   Performance deficits / Impairments: Decreased functional mobility ; Decreased strength;Decreased endurance;Decreased safe awareness;Decreased ADL status; Decreased balance  Assessment: Pt 79 yo male functioning with deficits in the areas listed above following repair incarcerated RIH. Pt reports IND PLOF and lives at home with son. Pt typically ambulates with cane but reports 1 recent fall. Pt is mildly unsteady requiring min-CGa for bathroom mobility. Pt educated on the role of therapy and importance of continued activity. Pt would benefit from skilled OT services while in acute care prior to d/c home with 24 hour assist.  Treatment Diagnosis: repair incarcerated RIH  Prognosis: Good  Decision Making: Medium Complexity  OT Education: OT Role;Plan of Care;Transfer Training;Energy Conservation;Precautions; ADL Adaptive Strategies  Patient Education: disease specific: importance of OOB activity  REQUIRES OT FOLLOW UP: Yes  Activity Tolerance  Activity Tolerance: Patient Tolerated treatment well;Patient limited by fatigue  Activity Tolerance: /70 seated EOB, BP dropped in standing but pt asymptomatic, 155/68 after ambulation  Safety Devices  Safety Devices in place: Yes  Type of devices: Call light within reach; Chair alarm in place;Gait belt;Nurse notified; Left in chair           Patient Diagnosis(es): The primary encounter diagnosis was Small bowel obstruction ([facility] [address] A diagnosis of ALE (acute kidney injury) ([facility] [address] was also pertinent to this visit. has a past medical history of Aortic aneurysm ([facility] [address] Arthritis, Cancer ([facility] [address] CKD (chronic kidney disease) stage 5, GFR less than 15 ml/min ([facility] [address] Diabetes mellitus ([facility] [address] Hyperlipidemia, Hypertension, Urinary problem, and Wears glasses. has a past surgical history that includes TURP; Lung biopsy; other surgical history; and Cataract removal with implant (3/2012).     Treatment Diagnosis: repair incarcerated [facility]      Restrictions  Restrictions/Precautions  Restrictions/Precautions: General Precautions, Fall Risk    Subjective   General  Chart Reviewed: Yes  Patient assessed for rehabilitation services?: Yes  Family / Caregiver Present: No  Referring Practitioner: BRIAN Montanez CNP  Diagnosis: lower abdominal pain  Subjective  Subjective: Pt agreeable to therapy  General Comment  Comments: RN approved therapy  Patient Currently in Pain: Denies  Vital Signs  Temp: 97.9 °F (36.6 °C)  Temp Source: Oral  Pulse: 80  Heart Rate Source: Monitor  Resp: 18  BP: (!) 155/68  BP Location: Left upper arm  MAP (mmHg): 97  Patient Position: Sitting  Level of Consciousness: Alert (0)  MEWS Score: 3  Patient Currently in Pain: Denies  Oxygen Therapy  SpO2: 92 %  O2 Device: None (Room air)     Social/Functional History  Social/Functional History  Lives With: Son  Type of Home: House  Home Layout: Two level, Able to Live on Main level with bedroom/bathroom, Bed/Bath upstairs  Home Access: Stairs to enter with rails  Entrance Stairs - Number of Steps: 4  Entrance Stairs - Rails: Right  Bathroom Shower/Tub: Tub/Shower unit, Shower chair without back  Bathroom Toilet: Standard  Bathroom Equipment: Grab bars around toilet, Hand-held shower  Home Equipment: Cane, Rolling walker, Reacher  ADL Assistance: Independent  Homemaking Responsibilities: Yes  Ambulation Assistance: Independent  Transfer Assistance: Independent  Active : No  Occupation: Retired  Type of occupation: fire department Additional Comments: pt reports 1 recent fall at home       Objective   Vision: Impaired  Vision Exceptions: Wears glasses at all times  Hearing: Within functional limits    Orientation  Overall Orientation Status: Within Functional Limits  Observation/Palpation  Posture: Fair  Balance  Sitting Balance: Stand by assistance  Standing Balance: Contact guard assistance(cane)  Functional Mobility  Functional - Mobility Device: Cane  Activity: To/from bathroom  Assist Level: Minimal assistance  Functional Mobility Comments: min-CGA with cane  Toilet Transfers  Toilet - Technique: Ambulating  Equipment Used: Standard toilet  Toilet Transfer: Contact guard assistance  ADL  Feeding: Independent  Toileting: Dependent/Total(mcmillan)  Tone RUE  RUE Tone: Normotonic  Tone LUE  LUE Tone: Normotonic  Coordination  Movements Are Fluid And Coordinated: Yes     Bed mobility  Rolling to Right: Unable to assess(seated EOB upon arrival)  Supine to Sit: Unable to assess(up in chair at end of session)  Transfers  Sit to stand: Contact guard assistance  Stand to sit: Contact guard assistance     Cognition  Overall Cognitive Status: Exceptions  Memory: Decreased long term memory  Problem Solving: Assistance required to generate solutions;Assistance required to correct errors made                 LUE AROM (degrees)  LUE AROM : WFL  RUE AROM (degrees)  RUE AROM : WFL  LUE Strength  Gross LUE Strength: WFL  RUE Strength  Gross RUE Strength: WFL                   Plan   Plan  Times per week: 3-5x/wk  Current Treatment Recommendations: Endurance Training, Balance Training, Safety Education & Training, Self-Care / ADL      [facility] Score        [facility] Inpatient Daily Activity Raw Score: 16 (12/21/20 1013)  AM-PAC Inpatient ADL T-Scale Score : 35.96 (12/21/20 1013)  ADL Inpatient CMS 0-100% Score: 53.32 (12/21/20 1013)  ADL Inpatient CMS G-Code Modifier : CK (12/21/20 1013)    Goals  Short term goals Time Frame for Short term goals: 1 week (12/28/20)  Short term goal 1: Pt will complete toilet transfer with s. Short term goal 2: Pt will complete LB dressing with s. Short term goal 3: Pt will complete 5 minutes of dynamic standing for adls with SBA. Short term goal 4: Pt will complete 15 reps of BUE exercises for increased endurance and strength. (12/24/20)  Patient Goals   Patient goals : \"to get back home\"       Therapy Time   Individual Concurrent Group Co-treatment   Time In 0854         Time Out 0914         Minutes 20         Timed Code Treatment Minutes: 10 Minutes(10 minutes for eval)       Joaquina Rust OTR/L    If pt is unable to be seen after this session, please let this note serve as discharge summary. Please see case management note for discharge disposition. Thank you.

## 2020-12-22 ENCOUNTER — HOSPITAL ENCOUNTER (OUTPATIENT)
Dept: NURSING | Age: 85
Setting detail: INFUSION SERIES
End: 2020-12-22
Payer: MEDICARE

## 2020-12-22 LAB
ANION GAP SERPL CALCULATED.3IONS-SCNC: 14 MMOL/L (ref 3–16)
BUN BLDV-MCNC: 72 MG/DL (ref 7–20)
CALCIUM SERPL-MCNC: 8.5 MG/DL (ref 8.3–10.6)
CHLORIDE BLD-SCNC: 102 MMOL/L (ref 99–110)
CO2: 19 MMOL/L (ref 21–32)
CREAT SERPL-MCNC: 4.8 MG/DL (ref 0.8–1.3)
GFR AFRICAN AMERICAN: 14
GFR NON-AFRICAN AMERICAN: 11
GLUCOSE BLD-MCNC: 151 MG/DL (ref 70–99)
GLUCOSE BLD-MCNC: 151 MG/DL (ref 70–99)
GLUCOSE BLD-MCNC: 162 MG/DL (ref 70–99)
GLUCOSE BLD-MCNC: 165 MG/DL (ref 70–99)
GLUCOSE BLD-MCNC: 190 MG/DL (ref 70–99)
PERFORMED ON: ABNORMAL
POTASSIUM REFLEX MAGNESIUM: 4.6 MMOL/L (ref 3.5–5.1)
SODIUM BLD-SCNC: 135 MMOL/L (ref 136–145)

## 2020-12-22 PROCEDURE — 80048 BASIC METABOLIC PNL TOTAL CA: CPT

## 2020-12-22 PROCEDURE — 97530 THERAPEUTIC ACTIVITIES: CPT

## 2020-12-22 PROCEDURE — 6370000000 HC RX 637 (ALT 250 FOR IP): Performed by: NURSE PRACTITIONER

## 2020-12-22 PROCEDURE — 6370000000 HC RX 637 (ALT 250 FOR IP): Performed by: HOSPITALIST

## 2020-12-22 PROCEDURE — 2500000003 HC RX 250 WO HCPCS: Performed by: SURGERY

## 2020-12-22 PROCEDURE — 1200000000 HC SEMI PRIVATE

## 2020-12-22 PROCEDURE — 99024 POSTOP FOLLOW-UP VISIT: CPT | Performed by: SURGERY

## 2020-12-22 PROCEDURE — 36415 COLL VENOUS BLD VENIPUNCTURE: CPT

## 2020-12-22 PROCEDURE — 97110 THERAPEUTIC EXERCISES: CPT

## 2020-12-22 PROCEDURE — 2580000003 HC RX 258: Performed by: NURSE PRACTITIONER

## 2020-12-22 PROCEDURE — 97116 GAIT TRAINING THERAPY: CPT

## 2020-12-22 RX ADMIN — BRIMONIDINE TARTRATE 1 DROP: 2 SOLUTION OPHTHALMIC at 08:56

## 2020-12-22 RX ADMIN — PREDNISOLONE ACETATE 1 DROP: 10 SUSPENSION/ DROPS OPHTHALMIC at 21:05

## 2020-12-22 RX ADMIN — BRIMONIDINE TARTRATE 1 DROP: 2 SOLUTION OPHTHALMIC at 21:05

## 2020-12-22 RX ADMIN — ATROPINE SULFATE 1 DROP: 10 SOLUTION OPHTHALMIC at 08:58

## 2020-12-22 RX ADMIN — SODIUM CHLORIDE: 9 INJECTION, SOLUTION INTRAVENOUS at 17:54

## 2020-12-22 RX ADMIN — TIMOLOL MALEATE 1 DROP: 5 SOLUTION/ DROPS OPHTHALMIC at 21:05

## 2020-12-22 RX ADMIN — LABETALOL HYDROCHLORIDE 10 MG: 5 INJECTION INTRAVENOUS at 08:53

## 2020-12-22 RX ADMIN — TIMOLOL MALEATE 1 DROP: 5 SOLUTION/ DROPS OPHTHALMIC at 08:57

## 2020-12-22 RX ADMIN — INSULIN LISPRO 2 UNITS: 100 INJECTION, SOLUTION INTRAVENOUS; SUBCUTANEOUS at 21:04

## 2020-12-22 RX ADMIN — PREDNISOLONE ACETATE 1 DROP: 10 SUSPENSION/ DROPS OPHTHALMIC at 08:57

## 2020-12-22 RX ADMIN — HYDRALAZINE HYDROCHLORIDE 25 MG: 25 TABLET, FILM COATED ORAL at 21:04

## 2020-12-22 RX ADMIN — HYDRALAZINE HYDROCHLORIDE 25 MG: 25 TABLET, FILM COATED ORAL at 08:56

## 2020-12-22 RX ADMIN — BRIMONIDINE TARTRATE 1 DROP: 2 SOLUTION OPHTHALMIC at 14:00

## 2020-12-22 RX ADMIN — LABETALOL HYDROCHLORIDE 10 MG: 5 INJECTION INTRAVENOUS at 03:54

## 2020-12-22 RX ADMIN — ATROPINE SULFATE 1 DROP: 10 SOLUTION OPHTHALMIC at 21:05

## 2020-12-22 RX ADMIN — INSULIN LISPRO 2 UNITS: 100 INJECTION, SOLUTION INTRAVENOUS; SUBCUTANEOUS at 08:59

## 2020-12-22 RX ADMIN — INSULIN LISPRO 2 UNITS: 100 INJECTION, SOLUTION INTRAVENOUS; SUBCUTANEOUS at 17:53

## 2020-12-22 RX ADMIN — HYDRALAZINE HYDROCHLORIDE 25 MG: 25 TABLET, FILM COATED ORAL at 14:00

## 2020-12-22 RX ADMIN — INSULIN LISPRO 2 UNITS: 100 INJECTION, SOLUTION INTRAVENOUS; SUBCUTANEOUS at 12:23

## 2020-12-22 RX ADMIN — FAMOTIDINE 20 MG: 10 INJECTION INTRAVENOUS at 08:55

## 2020-12-22 RX ADMIN — ERYTHROMYCIN 1 CM: 5 OINTMENT OPHTHALMIC at 21:06

## 2020-12-22 ASSESSMENT — PAIN SCALES - GENERAL: PAINLEVEL_OUTOF10: 2

## 2020-12-22 NOTE — PROGRESS NOTES
Pt assessment completed and charted. VSS. Pt a/o. F/C in place draining appropriately. Pt denies pain/nausea at this time. Bed in lowest position and wheels locked. Call light within reach. Bedside table within reach. Non-skid footwear in place. Pt denies any other needs at this time. Pt calls out appropriately. Will continue to monitor.

## 2020-12-22 NOTE — CARE COORDINATION
[facility]    Referral received from  to follow for home care services. I will follow for needs, and speak with patient to verify demos.     Kilo Rosenthal RN, BSN CTN  [facility] [phone]

## 2020-12-22 NOTE — PROGRESS NOTES
Pt A&Ox4. VSS- BP elevated, labetalol administered per orders. Shift assessment completed. Pt tolerating full liquid diet, no complaints of nausea. Denies pain. Currently up in chair. Call light within reach, bed in lowest position, wheels locked. Alarm on and audible. Will monitor.

## 2020-12-22 NOTE — PROGRESS NOTES
Physical Therapy  Facility/Department: [facility] C3 TELE/MED SURG/ONC  Daily Treatment Note  NAME: Mary Beth Tong  : 10/17/1927  MRN: [id number]    Date of Service: 2020    Discharge Recommendations:  Home with Home health PT, 24 hour supervision or assist   PT Equipment Recommendations  Equipment Needed: No  Other: pt owns RW    Assessment   Body structures, Functions, Activity limitations: Decreased functional mobility ; Decreased balance;Decreased safe awareness;Decreased endurance;Decreased coordination  Assessment: PT tx session focused on functional transfers, gait training and BLE strengthening. Pt requires up to min A to stand from lower surfaces given pt's height and CGA for ambulation. Pt tolerated seated program well and without complaint. Pt demonstrates improved steadiness with  compared to [facility] this date, continue to recommend that pt utilize RW at least initially at home to reduce fall risk, pt verbalized understanding. Will continue POC while admitted. Recommend home with 24/7 supervision/assist and Home PT at d/c. Treatment Diagnosis: impaired functional mobility  Specific instructions for Next Treatment: progress mobility as tolerated  Prognosis: Good  Decision Making: Medium Complexity  PT Education: Goals; General Safety;Gait Training;PT Role;Disease Specific Education;Plan of Care; Functional Mobility Training;Transfer Training;Energy Conservation  Patient Education: Pt educated on importance of OOB mobility and sitting up in chair post surgery -- pt verbalized understanding  Barriers to Learning: none  REQUIRES PT FOLLOW UP: Yes  Activity Tolerance  Activity Tolerance: Patient Tolerated treatment well  Activity Tolerance: SpO2 88% with activity, cues for PLB to return to 93% after ~2 minutes     Patient Diagnosis(es): The primary encounter diagnosis was Small bowel obstruction ([facility] [address] A diagnosis of ALE (acute kidney injury) ([facility] [address] was also pertinent to this visit. Current Treatment Recommendations: Strengthening, Neuromuscular Re-education, Home Exercise Program, Safety Education & Training, Balance Training, Endurance Training, Functional Mobility Training, Transfer Training, Gait Training, Stair training, Equipment Evaluation, Education, & procurement, Patient/Caregiver Education & Training  Safety Devices  Type of devices: All fall risk precautions in place, Gait belt, Patient at risk for falls, Left in chair, Nurse notified(PCA present to assist with shower.)  Restraints  Initially in place: No     Therapy Time   Individual Concurrent Group Co-treatment   Time In 0911         Time Out 0951         Minutes 40         Timed Code Treatment Minutes: [address], PT     If pt is unable to be seen after this session, please let this note serve as discharge summary. Please see case management note for discharge disposition. Thank you.

## 2020-12-22 NOTE — CARE COORDINATION
Chart review day 3: Patient on C3 re Incarcerated right inguinal hernia followed by GS, Nephrology, and IM. [facility]. Patient plans to return home with live in support from son. SW will ct to follow for DCP needs. BOGDAN Quinn

## 2020-12-22 NOTE — PROGRESS NOTES
NEPHROLOGY PROGRESS NOTE    CC: ALE, CKD  HPI  This is a patient with significant past medical history of CKD stage 5 who presented 12/19 with right groin pain x 1 day. He was found to have bilateral hernias with an SBO from an incarcerated right inguinal hernia. He went to the OR and underwent right inguinal hernia repair  He follows with my partner Dr. Manoj Jesus in the office, and of late has a baseline Cr around 3.5-3.7. His Cr on admission was 5.2        SUBJECTIVE  Resting comfortably  No CP or SOB    SOC: no visitors        Scheduled Meds:   insulin lispro  0-12 Units Subcutaneous 4x Daily AC & HS    hydrALAZINE  25 mg Oral TID    Netarsudil-Latanoprost  1 drop Right Eye QPM    atropine  1 drop Left Eye BID    brimonidine  1 drop Left Eye TID    erythromycin  1 cm Right Eye Nightly    prednisoLONE acetate  1 drop Left Eye BID    timolol  1 drop Right Eye BID    famotidine (PEPCID) injection  20 mg Intravenous Daily     Continuous Infusions:   sodium chloride 50 mL/hr at 12/21/20 2044    dextrose       PRN Meds:oxyCODONE **OR** oxyCODONE, labetalol, glucose, dextrose, glucagon (rDNA), dextrose, promethazine **OR** ondansetron, acetaminophen **OR** acetaminophen      Objective:      Physical Exam  Wt Readings from Last 3 Encounters:   12/22/20 184 lb 1.4 oz (83.5 kg)   11/10/20 182 lb (82.6 kg)   11/03/20 180 lb (81.6 kg)     Temp Readings from Last 3 Encounters:   12/22/20 97.3 °F (36.3 °C) (Oral)   11/24/20 97.2 °F (36.2 °C) (Temporal)   11/10/20 96.5 °F (35.8 °C) (Temporal)     BP Readings from Last 3 Encounters:   12/22/20 (!) 187/90   12/19/20 138/64   11/24/20 (!) 152/78     Pulse Readings from Last 3 Encounters:   12/22/20 80   11/24/20 67   11/10/20 81   Physical Exam:  Gen: Resting in bed, NAD. HEENT: MMM, OP clear. CV: RRR no m/r. No S3.  Lungs: Clear bilaterally. No rhonchi. Abd: S/NT +BS  Right hernia repair dressed. Ext: No edema, no cyanosis  Skin: Warm. No rashes appreciated. : No TTP over bladder, nondistended. Neuro: Alert and oriented x 3, nonfocal.  Joints: No erythema noted over joints. Lab Review   Lab Results   Component Value Date    WBC 9.2 12/21/2020    HGB 10.1 (L) 12/21/2020    HCT 30.3 (L) 12/21/2020    MCV 87.4 12/21/2020     12/21/2020     Lab Results   Component Value Date     12/22/2020    K 4.6 12/22/2020     12/22/2020    CO2 19 12/22/2020    BUN 72 12/22/2020    CREATININE 4.8 12/22/2020    GLUCOSE 162 12/22/2020    CALCIUM 8.5 12/22/2020          Patient Active Problem List    Diagnosis Date Noted    Incarcerated right inguinal hernia 12/19/2020    Stage 5 chronic kidney disease not on chronic dialysis ([facility] [address] 12/19/2020    Small bowel obstruction (HCC)     Iron deficiency anemia secondary to blood loss (chronic) 10/15/2020    Iliopsoas muscle hematoma, left, initial encounter 10/07/2020    Anemia due to blood loss, acute 10/07/2020    Diabetes mellitus ([facility] [address]     Hyperlipidemia     Hypertension     Left hip pain        ASSESSMENT AND PLAN     1. ALE on CKD stage 5: He follows with my partner Dr. Denton Gross. At the last visit last week, she did discuss his overall goals of care, and he does not want hemodialysis. -his baseline is a Cr of 3.5-3.7. His CKD is thought to be due to diabetic nephropathy. Continue IV fluids and supportive care. ALE likely from volume depletion in the setting of his SBO and home diuretics. He is making some urine.  -does not wish for RRT     2. SBO from incarcerated right inguinal hernia: S/p repair last night. Doing better.     3. Anemia of CKD: Hgb down a bit overnight, likely he was hemoconcentrated as his hgb 11/24 and 12/8 are closer to his range today. Trend out - no indication for epo. He is on iron as an outpatient.     4. Hx of hyperkalemia: Off losartan d/t this.   Previously on veltassa but has been off for some time.    5. Metabolic acidosis: CO2 at goal.  On oral bicarb as an outpatient.     6. Proteinuria: Typically on lasix 3x/week for volume control with his proteinuria. Holding now, watch fluid status.

## 2020-12-22 NOTE — PROGRESS NOTES
[facility] GENERAL SURGERY    Surgery Progress Note           POD # 3    PATIENT NAME: Desi Pulido     TODAY'S DATE: 12/22/2020    INTERVAL HISTORY:    Pt without nausea. OBJECTIVE:   VITALS:  BP (!) 174/81   Pulse 73   Temp 97.5 °F (36.4 °C) (Oral)   Resp 16   Ht 6' 4\" (1.93 m)   Wt 184 lb 1.4 oz (83.5 kg)   SpO2 95%   BMI 22.41 kg/m²     INTAKE/OUTPUT:    I/O last 3 completed shifts: In: 3685 [P.O.:480; I.V.:3205]  Out: 950 [Urine:950]  I/O this shift: In: 480 [P.O.:480]  Out: 220 [Urine:220]              CONSTITUTIONAL:  awake and alert  LUNGS:  no crackles or wheezing  ABDOMEN:   normal bowel sounds, soft, non-distended, tender   INCISION: no drainage    Data:  CBC:   Recent Labs     12/19/20  1354 12/20/20  1038 12/21/20  0533   WBC 11.0 8.6 9.2   HGB 12.7* 10.9* 10.1*   HCT 38.3* 33.3* 30.3*    150 146     BMP:    Recent Labs     12/20/20  0552 12/21/20  0533 12/22/20  0605    137 135*   K 4.7 4.4 4.6    105 102   CO2 22 20* 19*   BUN 81* 83* 72*   CREATININE 4.9* 4.4* 4.8*   GLUCOSE 155* 161* 162*     Hepatic:   Recent Labs     12/19/20  1354   AST 19   ALT 10   BILITOT 0.3   ALKPHOS 100     Mag:      Recent Labs     12/20/20  0552   MG 1.70*      Phos:     Recent Labs     12/20/20  0552 12/21/20  0533   PHOS 6.8* 5.6*      INR:   Recent Labs     12/20/20  0013   INR 1.06         Radiology Review:  NA    ASSESSMENT AND PLAN:  80 y.o. male status post right inguinal hernia repair  1. Advance diet today  2. Po pain control  3.   Likely ok to discharge tomorrow        Electronically signed by Yelena Cordova MD

## 2020-12-23 VITALS
DIASTOLIC BLOOD PRESSURE: 50 MMHG | BODY MASS INDEX: 22.95 KG/M2 | RESPIRATION RATE: 18 BRPM | WEIGHT: 188.49 LBS | OXYGEN SATURATION: 94 % | HEIGHT: 76 IN | TEMPERATURE: 97.7 F | SYSTOLIC BLOOD PRESSURE: 173 MMHG | HEART RATE: 80 BPM

## 2020-12-23 LAB
ANION GAP SERPL CALCULATED.3IONS-SCNC: 13 MMOL/L (ref 3–16)
BUN BLDV-MCNC: 81 MG/DL (ref 7–20)
CALCIUM SERPL-MCNC: 8.4 MG/DL (ref 8.3–10.6)
CHLORIDE BLD-SCNC: 104 MMOL/L (ref 99–110)
CO2: 17 MMOL/L (ref 21–32)
CREAT SERPL-MCNC: 4.4 MG/DL (ref 0.8–1.3)
GFR AFRICAN AMERICAN: 15
GFR NON-AFRICAN AMERICAN: 13
GLUCOSE BLD-MCNC: 116 MG/DL (ref 70–99)
GLUCOSE BLD-MCNC: 131 MG/DL (ref 70–99)
GLUCOSE BLD-MCNC: 197 MG/DL (ref 70–99)
PERFORMED ON: ABNORMAL
PERFORMED ON: ABNORMAL
POTASSIUM REFLEX MAGNESIUM: 4.5 MMOL/L (ref 3.5–5.1)
SODIUM BLD-SCNC: 134 MMOL/L (ref 136–145)

## 2020-12-23 PROCEDURE — 6370000000 HC RX 637 (ALT 250 FOR IP): Performed by: NURSE PRACTITIONER

## 2020-12-23 PROCEDURE — 2500000003 HC RX 250 WO HCPCS: Performed by: SURGERY

## 2020-12-23 PROCEDURE — 99024 POSTOP FOLLOW-UP VISIT: CPT | Performed by: SURGERY

## 2020-12-23 PROCEDURE — 36415 COLL VENOUS BLD VENIPUNCTURE: CPT

## 2020-12-23 PROCEDURE — 80048 BASIC METABOLIC PNL TOTAL CA: CPT

## 2020-12-23 RX ADMIN — HYDRALAZINE HYDROCHLORIDE 25 MG: 25 TABLET, FILM COATED ORAL at 09:39

## 2020-12-23 RX ADMIN — ATROPINE SULFATE 1 DROP: 10 SOLUTION OPHTHALMIC at 09:39

## 2020-12-23 RX ADMIN — PREDNISOLONE ACETATE 1 DROP: 10 SUSPENSION/ DROPS OPHTHALMIC at 09:39

## 2020-12-23 RX ADMIN — BRIMONIDINE TARTRATE 1 DROP: 2 SOLUTION OPHTHALMIC at 09:39

## 2020-12-23 RX ADMIN — BRIMONIDINE TARTRATE 1 DROP: 2 SOLUTION OPHTHALMIC at 14:15

## 2020-12-23 RX ADMIN — FAMOTIDINE 20 MG: 10 INJECTION INTRAVENOUS at 09:40

## 2020-12-23 RX ADMIN — TIMOLOL MALEATE 1 DROP: 5 SOLUTION/ DROPS OPHTHALMIC at 09:37

## 2020-12-23 RX ADMIN — INSULIN LISPRO 2 UNITS: 100 INJECTION, SOLUTION INTRAVENOUS; SUBCUTANEOUS at 11:47

## 2020-12-23 RX ADMIN — HYDRALAZINE HYDROCHLORIDE 25 MG: 25 TABLET, FILM COATED ORAL at 14:15

## 2020-12-23 NOTE — PROGRESS NOTES
Physical Therapy    No Charge. Attempted pt this Pm. Pt declining PT at this time and wants to rest. Encouraged pt to participate however pt continued to decline. Will re attempt as schedule permits and pt condition allows.      Viola Hamilton, PT, DPT

## 2020-12-23 NOTE — DISCHARGE INSTR - COC
None active    Resolved    COVID-19 Rule Out 12/19/20 12/19/20 12/19/20 COVID-19 (Ordered)   12/19/20 Rule-Out Test Resulted    C-diff Rule Out 10/07/20 10/07/20 10/07/20 Clostridium difficile toxin/antigen (Ordered)   10/08/20 Jeremias Thurston RN    COVID-19 Rule Out 10/06/20 10/06/20 10/06/20 COVID-19 (Ordered)   10/06/20 Rule-Out Test Resulted            Nurse Assessment:  Last Vital Signs: BP (!) 173/93   Pulse 93   Temp 97.8 °F (36.6 °C) (Oral)   Resp 16   Ht 6' 4\" (1.93 m)   Wt 188 lb 7.9 oz (85.5 kg)   SpO2 90%   BMI 22.94 kg/m²     Last documented pain score (0-10 scale): Pain Level: 2  Last Weight:   Wt Readings from Last 1 Encounters:   12/23/20 188 lb 7.9 oz (85.5 kg)     Mental Status:  oriented and alert    IV Access:  - None    Nursing Mobility/ADLs:  Walking   Independent  Transfer  Assisted  Bathing  Assisted  Dressing  Assisted  Toileting  Assisted  Feeding  Independent  Med Admin  Independent  Med Delivery   whole    Wound Care Documentation and Therapy:        Elimination:  Continence:   · Bowel: Yes  · Bladder: self caths  ·   Urinary Catheter: None   Colostomy/Ileostomy/Ileal Conduit: No       Date of Last BM: 12/22/20    Intake/Output Summary (Last 24 hours) at 12/23/2020 0900  Last data filed at 12/23/2020 0209  Gross per 24 hour   Intake 1873.97 ml   Output 845 ml   Net 1028.97 ml     I/O last 3 completed shifts: In: Ani Hart [P.O.:1200; I.V.:674]  Out: 845 [Urine:845]    Safety Concerns:     None    Impairments/Disabilities:      None    Nutrition Therapy:  Current Nutrition Therapy:   - Oral Diet:  General    Routes of Feeding: Oral  Liquids: No Restrictions  Daily Fluid Restriction: no  Last Modified Barium Swallow with Video (Video Swallowing Test): not done    Treatments at the Time of Hospital Discharge:   Respiratory Treatments: none  Oxygen Therapy:  is not on home oxygen therapy.   Ventilator:    - No ventilator support Rehab Therapies: Physical Therapy, Occupational Therapy and Nurse  Weight Bearing Status/Restrictions: No weight bearing restirctions  Other Medical Equipment (for information only, NOT a DME order):  cane  Other Treatments: HOME HEALTH CARE: LEVEL 3 [address] to establish plan of care for patient over 60 day period   ? Nursing  ? Initial home SN evaluation visit to occur within 24-48 hours for:  1)  medication management  2)  VS and clinical assessment  3)  S&S chronic disease exacerbation education + when to contact MD/NP  4)  care coordination  ? Medication Reconciliation during 1st SN visit  ? PT/OT/Speech   ? Evaluations in home within 24-48 hours of discharge to include DME and home safety   ? Frontload therapy 5 days, then 3x a week   ? OT to evaluate if patient has [address] [facility] needs for personal care   ?  evaluation within 24-48 hours to evaluate resources [place] for potential AL, [place], LTC, and Medicaid options   ?  Palliative Care referral within 5 days of hospital discharge   PCP Visit scheduled within 3 - 7 days of hospital discharge    [address] (If patient is agreeable and meets guidelines)      Patient's personal belongings (please select all that are sent with patient):  Glasses, Dentures upper and lower    RN SIGNATURE:  Electronically signed by Luda Delaney RN on 12/23/20 at 6:49 PM EST    CASE MANAGEMENT/SOCIAL WORK SECTION    Inpatient Status Date: 12/19/2020    Readmission Risk Assessment Score:  Readmission Risk              Risk of Unplanned Readmission:        20         Discharging to Facility/ [address] [place] [address]   [address] [facility] [address]                                                                      [address] [place] [address]   [phone]   / signature: Electronically signed by BOGDAN Santamaria on 12/23/20 at 9:00 AM EST    [facility] Prognosis: Fair    Condition at Discharge: Stable    Rehab Potential (if transferring to Rehab): Not applicable     Recommended Labs or Other Treatments After Discharge: PT/OT    Physician Certification: I certify the above information and transfer of Keeley Clay  is necessary for the continuing treatment of the diagnosis listed and that he requires [address] for greater 30 days.      Update Admission H&P: No change in H&P    PHYSICIAN SIGNATURE:  Electronically signed by BRIAN Macias CNP on 12/23/20 at 3:03 PM EST

## 2020-12-23 NOTE — PROGRESS NOTES
Pt alert and oriented, VSS. Shift assessment completed and documented. Tolerating diet, active bowl sounds. Dressing to incision, C/D/I. Pt denies any pain. L testical swollen, normal per patient, f/c in place. Denies any needs at this time. Will continue to monitor.

## 2020-12-23 NOTE — PROGRESS NOTES
Assessment completed and documented. VSS. A/ox4. R groin incision dressing C/D/I. Blood cath in place. IV fluids running. BS active, patient reports passing gas. Denies pain. Bed locked and in lowest position. Bedside table and call light within reach. Denies further needs at this time.

## 2020-12-23 NOTE — PROGRESS NOTES
Pt discharged per order. F/c and IV removed by Jose Tubbs RN, no complications. Left floor in stable condition to home with all belongings. Discharge instructions provided over phone to meghan Gabriel, all questions answered.

## 2020-12-23 NOTE — PROGRESS NOTES
[facility] GENERAL SURGERY    Surgery Progress Note           POD # 4    PATIENT NAME: Meena Honeycutt     TODAY'S DATE: 12/23/2020    INTERVAL HISTORY:    Pt  Doing well, no complaints, minimal pain. Eating solids, no BMs recorded. OBJECTIVE:   VITALS:  BP (!) 166/90   Pulse 91   Temp 97.6 °F (36.4 °C) (Oral)   Resp 18   Ht 6' 4\" (1.93 m)   Wt 188 lb 7.9 oz (85.5 kg)   SpO2 93%   BMI 22.94 kg/m²     INTAKE/OUTPUT:    I/O last 3 completed shifts: In: Marci Dyers [P.O.:1200; I.V.:674]  Out: 845 [Urine:845]  I/O this shift:  In: 240 [P.O.:240]  Out: 0               CONSTITUTIONAL:  awake and alert  LUNGS:     ABDOMEN:    , soft, non-distended, non-tender, stable large L scrotal hernia   INCISION: clean, dry    Data:  CBC:   Recent Labs     12/21/20  0533   WBC 9.2   HGB 10.1*   HCT 30.3*        BMP:    Recent Labs     12/21/20  0533 12/22/20  0605 12/23/20  0620    135* 134*   K 4.4 4.6 4.5    102 104   CO2 20* 19* 17*   BUN 83* 72* 81*   CREATININE 4.4* 4.8* 4.4*   GLUCOSE 161* 162* 116*     Hepatic: No results for input(s): AST, ALT, ALB, BILITOT, ALKPHOS in the last 72 hours. Mag:    No results for input(s): MG in the last 72 hours. Phos:     Recent Labs     12/21/20  0533   PHOS 5.6*      INR: No results for input(s): INR in the last 72 hours.       Radiology Review:       ASSESSMENT AND PLAN:  80 y.o. male status post repair incarcerated RIH   - cont w/ diet as tolerated   - PT/OT   - ALE - as per Nephrology    Pt should be ready for d/c home soon, when cleared by Nephrology         Electronically signed by Alexia Cancino MD

## 2020-12-23 NOTE — PROGRESS NOTES
NEPHROLOGY PROGRESS NOTE    CC: ALE, CKD  HPI  This is a patient with significant past medical history of CKD stage 5 who presented 12/19 with right groin pain x 1 day. He was found to have bilateral hernias with an SBO from an incarcerated right inguinal hernia. He went to the OR and underwent right inguinal hernia repair  He follows with my partner Dr. Kelsey Colmenares in the office, and of late has a baseline Cr around 3.5-3.7. His Cr on admission was 5.2        SUBJECTIVE  Eating lunch  No CP or SOB    SOC: no visitors        Scheduled Meds:   insulin lispro  0-12 Units Subcutaneous 4x Daily AC & HS    hydrALAZINE  25 mg Oral TID    Netarsudil-Latanoprost  1 drop Right Eye QPM    atropine  1 drop Left Eye BID    brimonidine  1 drop Left Eye TID    erythromycin  1 cm Right Eye Nightly    prednisoLONE acetate  1 drop Left Eye BID    timolol  1 drop Right Eye BID    famotidine (PEPCID) injection  20 mg Intravenous Daily     Continuous Infusions:   sodium chloride 50 mL/hr at 12/22/20 1754    dextrose       PRN Meds:oxyCODONE **OR** oxyCODONE, labetalol, glucose, dextrose, glucagon (rDNA), dextrose, promethazine **OR** ondansetron, acetaminophen **OR** acetaminophen      Objective:      Physical Exam  Wt Readings from Last 3 Encounters:   12/23/20 188 lb 7.9 oz (85.5 kg)   11/10/20 182 lb (82.6 kg)   11/03/20 180 lb (81.6 kg)     Temp Readings from Last 3 Encounters:   12/23/20 97.6 °F (36.4 °C) (Oral)   11/24/20 97.2 °F (36.2 °C) (Temporal)   11/10/20 96.5 °F (35.8 °C) (Temporal)     BP Readings from Last 3 Encounters:   12/23/20 (!) 166/90   12/19/20 138/64   11/24/20 (!) 152/78     Pulse Readings from Last 3 Encounters:   12/23/20 91   11/24/20 67   11/10/20 81   Physical Exam:  Gen: Resting in bed, NAD. HEENT: MMM, OP clear. CV: RRR no m/r. No S3.  Lungs: Clear bilaterally. No rhonchi. Abd: S/NT +BS  Right hernia repair dressed. Ext: No edema, no cyanosis  Skin: Warm. No rashes appreciated. : No TTP over bladder, nondistended. Neuro: Alert and oriented x 3, nonfocal.  Joints: No erythema noted over joints. Lab Review   Lab Results   Component Value Date    WBC 9.2 12/21/2020    HGB 10.1 (L) 12/21/2020    HCT 30.3 (L) 12/21/2020    MCV 87.4 12/21/2020     12/21/2020     Lab Results   Component Value Date     12/23/2020    K 4.5 12/23/2020     12/23/2020    CO2 17 12/23/2020    BUN 81 12/23/2020    CREATININE 4.4 12/23/2020    GLUCOSE 116 12/23/2020    CALCIUM 8.4 12/23/2020          Patient Active Problem List    Diagnosis Date Noted    Incarcerated right inguinal hernia 12/19/2020    Stage 5 chronic kidney disease not on chronic dialysis ([facility] [address] 12/19/2020    Small bowel obstruction (HCC)     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 ALE on CKD stage 5: He follows with my partner Dr. Susy Cobb. At the last visit last week, she did discuss his overall goals of care, and he does not want hemodialysis. -his baseline is a Cr of 3.5-3.7. His CKD is thought to be due to diabetic nephropathy. Continue IV fluids and supportive care. ALE likely from volume depletion in the setting of his SBO and home diuretics. He is making some urine.  -does not wish for RRT     2. SBO from incarcerated right inguinal hernia: S/p repair last night. Doing better.     3. Anemia of CKD: Hgb down a bit overnight, likely he was hemoconcentrated as his hgb 11/24 and 12/8 are closer to his range today. Trend out - no indication for epo. He is on iron as an outpatient.     4. Hx of hyperkalemia: Off losartan d/t this.   Previously on veltassa but has been off for some time.    5. Metabolic acidosis: CO2 at goal.  On oral bicarb as an outpatient.     6. Proteinuria: Typically on lasix 3x/week for volume control with his proteinuria. Holding now, watch fluid status.

## 2020-12-23 NOTE — CARE COORDINATION
CASE MANAGEMENT DISCHARGE SUMMARY    Discharge to: Home    Transportation: Via private car    Confirmed discharge plan with: Patient and son Myrna Ackerman via phone     Agency, name:  [address]     [address] [facility] [address] [facility] [id number] [phone]   RN, name: Chalino Hylton RN     Note: Discharging nurse to complete HECTOR, reconcile AVS, and place final copy with patient's discharge packet. RN to ensure that written prescriptions for  Level II medications are sent with patient to the facility as per protocol.     BOGDAN Mathews

## 2020-12-23 NOTE — PROGRESS NOTES
Occupational Therapy      Therapist attempted to see pt x2 for follow up OT session. Pt asleep on first attempt and now declining due to eating lunch. Pt educated on importance of continued activity and role of OT. Therapy will re-attempt as schedule allows.     Thank you    Annie Ayala OTR/L

## 2020-12-23 NOTE — DISCHARGE SUMMARY
Hospital Medicine Discharge Summary    Patient ID: Carlos Disla      Patient's PCP: Cassi Ornelas MD    Admit Date: 12/19/2020     Discharge Date:   12/23/2020    Admitting Physician: Brayan Clifford MD     Discharge Physician: BRIAN Hernandez - CNP     Discharge Diagnoses: Active Hospital Problems    Diagnosis    Incarcerated right inguinal hernia [K40.30]    Stage 5 chronic kidney disease not on chronic dialysis ([facility] [address] [N18.5]    Hypertension [I10]    Hyperlipidemia [E78.5]    Diabetes mellitus ([facility] [address] [E11.9]       The patient was seen and examined on day of discharge and this discharge summary is in conjunction with any daily progress note from day of discharge. Hospital Course:   80year old male presented to [facility] with complaints of abdominal pain found t have small bowel obstruction and incarcerated inguinal hernia. Abdominal pain due to incarcerated right inguinal hernia and small bowel obstruction (resolved):   - General surgery consulted. S/p surgical repair. Pt is tolerating diet. +flatus, no BM yet. - Continue prn analgesia. Encourage IS, out of bed, ambulation.      ALE (likely prerenal) on CKD stage 5:  - Baseline Cr ~3.5. Peaked to 5.2. Currently 4.4.  - Improved with IV fluids.   - Nephrology consulted. Pt to follow-up with Dr. Dom Tilley in 1 week. - Avoid nephrotoxic agents when possible. Metabolic acidosis:  - Secondary to above. On oral bicarb as an outpt. Hypertension:  - Uncontrolled. Continue his home hydralazine. Management per Nephrology.      Hyperlipidemia:  - Continue statin. Physical Exam Performed:     BP (!) 173/50   Pulse 80   Temp 97.7 °F (36.5 °C) (Axillary)   Resp 18   Ht 6' 4\" (1.93 m)   Wt 188 lb 7.9 oz (85.5 kg)   SpO2 94%   BMI 22.94 kg/m²       General appearance:  No apparent distress, appears stated age and cooperative. HEENT:  Normal cephalic, atraumatic without obvious deformity. Pupils equal, round, and reactive to light. Extra ocular muscles intact. Conjunctivae/corneas clear. Neck: Supple, with full range of motion. No jugular venous distention. Trachea midline. Respiratory:  Normal respiratory effort. Clear to auscultation, bilaterally without Rales/Wheezes/Rhonchi. Cardiovascular:  Regular rate and rhythm with normal S1/S2 without murmurs, rubs or gallops. Abdomen: Soft, non-tender, non-distended with normal bowel sounds. Musculoskeletal:  No clubbing, cyanosis or edema bilaterally. Full range of motion without deformity. Skin: Skin color, texture, turgor normal.  No rashes or lesions. Neurologic:  Neurovascularly intact without any focal sensory/motor deficits. Cranial nerves: II-XII intact, grossly non-focal.  Psychiatric:  Alert and oriented, thought content appropriate, normal insight  Capillary Refill: Brisk,< 3 seconds   Peripheral Pulses: +2 palpable, equal bilaterally       Labs: For convenience and continuity at follow-up the following most recent labs are provided:      CBC:    Lab Results   Component Value Date    WBC 9.2 12/21/2020    HGB 10.1 12/21/2020    HCT 30.3 12/21/2020     12/21/2020       Renal:    Lab Results   Component Value Date     12/23/2020    K 4.5 12/23/2020     12/23/2020    CO2 17 12/23/2020    BUN 81 12/23/2020    CREATININE 4.4 12/23/2020    CALCIUM 8.4 12/23/2020    PHOS 5.6 12/21/2020         Significant Diagnostic Studies    Radiology:   XR ABDOMEN (KUB) (SINGLE AP VIEW)   Final Result   Enteric catheter proximal port at the GE junction. Advance catheter 3-5 cm. CT ABDOMEN PELVIS WO CONTRAST Additional Contrast? None   Final Result   High-grade distal small-bowel obstruction, with apparent transition within a   right inguinal hernia. Left inguinal hernia with sigmoid colon and collapsed small bowel content.

## 2020-12-24 LAB
BLOOD CULTURE, ROUTINE: NORMAL
CULTURE, BLOOD 2: NORMAL

## 2020-12-24 NOTE — OP NOTE
[facility]                 Maddi Fleming                                OPERATIVE REPORT    PATIENT NAME: Yoselyn Cha                       :        10/17/1927  MED REC NO:   [id number]                          ROOM:       0321  ACCOUNT NO:   [de-identified]                           ADMIT DATE: 2020  PROVIDER:     Taqueria Bradshaw MD    DATE OF PROCEDURE:  2020    LOCATION:  [place]. PREOPERATIVE DIAGNOSIS:  Incarcerated right inguinal hernia. POSTOPERATIVE DIAGNOSIS:  Incarcerated right inguinal hernia. OPERATION PERFORMED:  Repair of incarcerated right inguinal hernia with  implantation of mesh. SURGEON:  Taqueria Bradshaw MD    ANESTHESIA:  General.    COMPLICATIONS:  None. ESTIMATED BLOOD LOSS:  Less than 50 mL. INDICATION FOR THE OPERATION:  This is a 70-year-old male who presented  with a bulge and pain in the right groin. He had known bilateral  inguinal hernias. The area on the right side was more swollen and he  was not able to reduce it. Normally, he would be able to push things  back inside. He presented to the hospital.  I was unable to reduce the  right side. I recommended operative intervention. He understood the  risks and benefits and wanted to proceed. DESCRIPTION OF OPERATION:  The patient was brought to the operating  room. General anesthesia was induced. He was prepped and draped in the  usual surgical sterile fashion. An oblique right groin incision was  made with a knife. Subcutaneous tissues were dissected and the external  abdominal oblique aponeurosis was incised along the course of its  fibers. The incision was extended medially to the external ring as well  as laterally. Inguinal contents were encircled. Inguinal contents were  reduced. Large indirect inguinal hernia sac was  from the cord  and vascular structures. I opened the sac.   Bowel was inspected and appeared viable. A high ligation of the sac was done with a 2-0 silk  suture. Interrupted 0 Ethibond sutures were used to bring the  transversalis fascia down to the shelving edge of the inguinal ligament  in an interrupted fashion. A piece of mesh was then cut  with a keyhole  to allow the cord and vascular structures to come through the mesh. I  started at the pubic tubercle and secured the mesh to the shelving edge  of the inguinal ligament in a running fashion. A couple of sutures were  placed medially to secure the mesh to the transversalis fascia. The  wings of the mesh were brought together and secured to the shelving edge  of the inguinal ligament. The appropriate amount of laxity was created  as the cord and vascular structures came through the mesh. Hemostasis  was confirmed. The external abdominal oblique aponeurosis was  reapproximated with running 0 Vicryl suture. Local anesthetic was  infiltrated. A 3-0 Vicryl was used to reapproximate the subcutaneous  tissues. A 4-0 Vicryl was used to reapproximate the skin. Benzoin and  Steri-Strip dressing were placed. DISPOSITION:  The patient tolerated the procedure without any acute  complication.         Madeline Lebron MD    D: 12/24/2020 2:22:19       T: 12/24/2020 7:14:46     [id number]  Job#: [id number]     Doc#: [id number]    CC:

## 2021-01-04 ENCOUNTER — OFFICE VISIT (OUTPATIENT)
Dept: SURGERY | Age: 86
End: 2021-01-04
Payer: MEDICARE

## 2021-01-04 VITALS
SYSTOLIC BLOOD PRESSURE: 179 MMHG | HEIGHT: 76 IN | BODY MASS INDEX: 23.64 KG/M2 | WEIGHT: 194.1 LBS | DIASTOLIC BLOOD PRESSURE: 85 MMHG | TEMPERATURE: 97.6 F

## 2021-01-04 DIAGNOSIS — K40.30 INCARCERATED LEFT INGUINAL HERNIA: Primary | ICD-10-CM

## 2021-01-04 DIAGNOSIS — Z01.818 PRE-OP TESTING: Primary | ICD-10-CM

## 2021-01-04 PROCEDURE — G8427 DOCREV CUR MEDS BY ELIG CLIN: HCPCS | Performed by: SURGERY

## 2021-01-04 PROCEDURE — 4040F PNEUMOC VAC/ADMIN/RCVD: CPT | Performed by: SURGERY

## 2021-01-04 PROCEDURE — 1036F TOBACCO NON-USER: CPT | Performed by: SURGERY

## 2021-01-04 PROCEDURE — 1111F DSCHRG MED/CURRENT MED MERGE: CPT | Performed by: SURGERY

## 2021-01-04 PROCEDURE — G8484 FLU IMMUNIZE NO ADMIN: HCPCS | Performed by: SURGERY

## 2021-01-04 PROCEDURE — 99214 OFFICE O/P EST MOD 30 MIN: CPT | Performed by: SURGERY

## 2021-01-04 PROCEDURE — 1123F ACP DISCUSS/DSCN MKR DOCD: CPT | Performed by: SURGERY

## 2021-01-04 PROCEDURE — G8420 CALC BMI NORM PARAMETERS: HCPCS | Performed by: SURGERY

## 2021-01-04 RX ORDER — SODIUM CHLORIDE 0.9 % (FLUSH) 0.9 %
10 SYRINGE (ML) INJECTION EVERY 12 HOURS SCHEDULED
Status: CANCELLED | OUTPATIENT
Start: 2021-01-04

## 2021-01-04 RX ORDER — SODIUM CHLORIDE 0.9 % (FLUSH) 0.9 %
10 SYRINGE (ML) INJECTION PRN
Status: CANCELLED | OUTPATIENT
Start: 2021-01-04

## 2021-01-05 DIAGNOSIS — K40.90 LEFT INGUINAL HERNIA: ICD-10-CM

## 2021-01-12 ENCOUNTER — OFFICE VISIT (OUTPATIENT)
Dept: PRIMARY CARE CLINIC | Age: 86
End: 2021-01-12
Payer: MEDICARE

## 2021-01-12 DIAGNOSIS — Z01.818 PREOP TESTING: Primary | ICD-10-CM

## 2021-01-12 PROCEDURE — 99211 OFF/OP EST MAY X REQ PHY/QHP: CPT | Performed by: NURSE PRACTITIONER

## 2021-01-12 PROCEDURE — G8420 CALC BMI NORM PARAMETERS: HCPCS | Performed by: NURSE PRACTITIONER

## 2021-01-12 PROCEDURE — G8428 CUR MEDS NOT DOCUMENT: HCPCS | Performed by: NURSE PRACTITIONER

## 2021-01-12 NOTE — PATIENT INSTRUCTIONS

## 2021-01-12 NOTE — PROGRESS NOTES
Jony Good received a viral test for COVID-19. They were educated on isolation and quarantine as appropriate. For any symptoms, they were directed to seek care from their PCP, given contact information to establish with a doctor, directed to an urgent care or the emergency room.

## 2021-01-13 LAB — SARS-COV-2: NOT DETECTED

## 2021-01-13 NOTE — PROGRESS NOTES
PRE OP INSTRUCTION SHEET   1. Do not eat or drink anything after 12 midnight  prior to surgery. This includes no water, chewing gum or mints. 2. Take the following pills will a small sip of water (see MAR)                                        3. Aspirin, Ibuprofen, Advil, Naproxen, Vitamin E, fish oil and other Anti-inflammatory products should be stopped for 5 days before surgery or as directed by your physician. 4. Check with your Doctor regarding stopping Plavix, Coumadin, Lovenox, Fragmin or other blood thinners   5. Do not smoke, and do not drink any alcoholic beverages 24 hours prior to surgery. This includes NA Beer. 6. You may brush your teeth and gargle the morning of surgery. DO NOT SWALLOW WATER   7. You MUST make arrangements for a responsible adult to take you home after your surgery. You will not be allowed to leave alone or drive yourself home. It is strongly suggested someone stay with you the first 24 hrs. Your surgery will be cancelled if you do not have a ride home. 8. A parent/legal guardian must accompany a child scheduled for surgery and plan to stay at the hospital until the child is discharged. Please do not bring other children with you. 9. Please wear simple, loose fitting clothing to the hospital.  Juan Miguel Jaureguier not bring valuables (money, credit cards, checkbooks, etc.) Do not wear any makeup (including no eye makeup) or nail polish on your fingers or toes. 10. DO NOT wear any jewelry or piercings on day of surgery. All body piercing jewelry must be removed. 11. If you have dentures,glasses, or contacts they will be removed before going to the OR; we will provide you a container. 12. Please see your family doctor/and cardiologist for a history & physical and/or concerning medications. Bring any test results/reports from your physician's office.  Have history and labs faxed to [phone] 13. Remember to bring Blood Bank bracelet on the day of surgery. 14. If you have a Living Will and Durable Power of  for Healthcare, please bring in a copy. 13. Notify your Surgeon if you develop any illness between now and surgery  time, cough, cold, fever, sore throat, nausea, vomiting, etc.  Please notify your surgeon if you experience dizziness, shortness of breath or blurred vision between now & the time of your surgery   16. DO NOT shave your operative site 96 hours prior to surgery. For face & neck surgery, men may use an electric razor 48 hours prior to surgery. 17. Shower with _x__Antibacterial soap (x_chlorhexidine for total joint  Pt's) shower two times before surgery.(the morning of and the night before. 18. To provide excellent care visitors will be limited to one in the room at any given time.   Please call pre admission testing if you any further questions [phone] or [phone]

## 2021-01-13 NOTE — PROGRESS NOTES
PRE OP INSTRUCTION SHEET   1. Do not eat or drink anything after 12 midnight  prior to surgery. This includes no water, chewing gum or mints. 2. Take the following pills will a small sip of water (see MAR)                                        3. Aspirin, Ibuprofen, Advil, Naproxen, Vitamin E, fish oil and other Anti-inflammatory products should be stopped for 5 days before surgery or as directed by your physician. 4. Check with your Doctor regarding stopping Plavix, Coumadin, Lovenox, Fragmin or other blood thinners   5. Do not smoke, and do not drink any alcoholic beverages 24 hours prior to surgery. This includes NA Beer. 6. You may brush your teeth and gargle the morning of surgery. DO NOT SWALLOW WATER   7. You MUST make arrangements for a responsible adult to take you home after your surgery. You will not be allowed to leave alone or drive yourself home. It is strongly suggested someone stay with you the first 24 hrs. Your surgery will be cancelled if you do not have a ride home. 8. A parent/legal guardian must accompany a child scheduled for surgery and plan to stay at the hospital until the child is discharged. Please do not bring other children with you. 9. Please wear simple, loose fitting clothing to the hospital.  Jessika Payer not bring valuables (money, credit cards, checkbooks, etc.) Do not wear any makeup (including no eye makeup) or nail polish on your fingers or toes. 10. DO NOT wear any jewelry or piercings on day of surgery. All body piercing jewelry must be removed. 11. If you have dentures,glasses, or contacts they will be removed before going to the OR; we will provide you a container. 12. Please see your family doctor/and cardiologist for a history & physical and/or concerning medications. Bring any test results/reports from your physician's office.  Have history and labs faxed to [phone] 13. Remember to bring Blood Bank bracelet on the day of surgery. 14. If you have a Living Will and Durable Power of  for Healthcare, please bring in a copy. 13. Notify your Surgeon if you develop any illness between now and surgery  time, cough, cold, fever, sore throat, nausea, vomiting, etc.  Please notify your surgeon if you experience dizziness, shortness of breath or blurred vision between now & the time of your surgery   16. DO NOT shave your operative site 96 hours prior to surgery. For face & neck surgery, men may use an electric razor 48 hours prior to surgery. 17. Shower with _x__Antibacterial soap (x_chlorhexidine for total joint  Pt's) shower two times before surgery.(the morning of and the night before. 18. To provide excellent care visitors will be limited to one in the room at any given time.   Please call pre admission testing if you any further questions [phone] or [phone]

## 2021-01-13 NOTE — PROGRESS NOTES
Preoperative Screening for Elective Surgery/Invasive Procedures While COVID-19 present in the community    ? Have you had any of the following symptoms?no  o Fever, chills  o Cough  o Shortness of breath  o Muscle aches/pain  o Diarrhea  o Abdominal pain, nausea, vomiting  o Loss or decrease in taste and / or smell  ? Risk of Exposure  o Have you recently been hospitalized for COVID-19 or flu-like illness, if so when?no  o Recently diagnosed with COVID-19, if so when?no  o Recently tested for COVID-19, if so when?yes 1/12/21 for surgery  o Have you been in close contact with a person or family member who currently has or recently had COVID-23? If yes, when and in what context?no  o Do you live with anybody who in the last 14 days has had fever, chills, shortness of breath, muscle aches, flu-like illness? no  o Do you have any close contacts or family members who are currently in the hospital for COVID-19 or flu-like illness? If yes, assess recent close contact with this person. no    Indicate if the patient has a positive screen by answering yes to one or more of the above questions. Patients who test positive or screen positive prior to surgery or on the day of surgery should be evaluated in conjunction with the surgeon/proceduralist/anesthesiologist to determine the urgency of the procedure.

## 2021-01-15 ENCOUNTER — ANESTHESIA EVENT (OUTPATIENT)
Dept: OPERATING ROOM | Age: 86
End: 2021-01-15
Payer: MEDICARE

## 2021-01-18 ENCOUNTER — ANESTHESIA (OUTPATIENT)
Dept: OPERATING ROOM | Age: 86
End: 2021-01-18
Payer: MEDICARE

## 2021-01-18 ENCOUNTER — HOSPITAL ENCOUNTER (OUTPATIENT)
Age: 86
Setting detail: OUTPATIENT SURGERY
Discharge: HOME | End: 2021-01-18
Attending: SURGERY | Admitting: SURGERY
Payer: MEDICARE

## 2021-01-18 VITALS
SYSTOLIC BLOOD PRESSURE: 165 MMHG | HEART RATE: 64 BPM | TEMPERATURE: 97.9 F | WEIGHT: 183 LBS | HEIGHT: 76 IN | BODY MASS INDEX: 22.29 KG/M2 | RESPIRATION RATE: 14 BRPM | OXYGEN SATURATION: 94 % | DIASTOLIC BLOOD PRESSURE: 79 MMHG

## 2021-01-18 VITALS — SYSTOLIC BLOOD PRESSURE: 116 MMHG | OXYGEN SATURATION: 91 % | DIASTOLIC BLOOD PRESSURE: 60 MMHG

## 2021-01-18 DIAGNOSIS — K40.30 INCARCERATED LEFT INGUINAL HERNIA: Primary | ICD-10-CM

## 2021-01-18 LAB
ANION GAP SERPL CALCULATED.3IONS-SCNC: 10 MMOL/L (ref 3–16)
BUN BLDV-MCNC: 63 MG/DL (ref 7–20)
CALCIUM SERPL-MCNC: 8.9 MG/DL (ref 8.3–10.6)
CHLORIDE BLD-SCNC: 104 MMOL/L (ref 99–110)
CO2: 22 MMOL/L (ref 21–32)
CREAT SERPL-MCNC: 4.3 MG/DL (ref 0.8–1.3)
GFR AFRICAN AMERICAN: 16
GFR NON-AFRICAN AMERICAN: 13
GLUCOSE BLD-MCNC: 115 MG/DL (ref 70–99)
GLUCOSE BLD-MCNC: 119 MG/DL (ref 70–99)
PERFORMED ON: ABNORMAL
POTASSIUM SERPL-SCNC: 4.6 MMOL/L (ref 3.5–5.1)
SODIUM BLD-SCNC: 136 MMOL/L (ref 136–145)

## 2021-01-18 PROCEDURE — 3600000012 HC SURGERY LEVEL 2 ADDTL 15MIN: Performed by: SURGERY

## 2021-01-18 PROCEDURE — 7100000000 HC PACU RECOVERY - FIRST 15 MIN: Performed by: SURGERY

## 2021-01-18 PROCEDURE — C1781 MESH (IMPLANTABLE): HCPCS | Performed by: SURGERY

## 2021-01-18 PROCEDURE — 2500000003 HC RX 250 WO HCPCS: Performed by: SURGERY

## 2021-01-18 PROCEDURE — 3700000000 HC ANESTHESIA ATTENDED CARE: Performed by: SURGERY

## 2021-01-18 PROCEDURE — 3700000001 HC ADD 15 MINUTES (ANESTHESIA): Performed by: SURGERY

## 2021-01-18 PROCEDURE — 2580000003 HC RX 258: Performed by: SURGERY

## 2021-01-18 PROCEDURE — 2709999900 HC NON-CHARGEABLE SUPPLY: Performed by: SURGERY

## 2021-01-18 PROCEDURE — 49507 PRP I/HERN INIT BLOCK >5 YR: CPT | Performed by: SURGERY

## 2021-01-18 PROCEDURE — 2580000003 HC RX 258: Performed by: ANESTHESIOLOGY

## 2021-01-18 PROCEDURE — 7100000001 HC PACU RECOVERY - ADDTL 15 MIN: Performed by: SURGERY

## 2021-01-18 PROCEDURE — 3600000002 HC SURGERY LEVEL 2 BASE: Performed by: SURGERY

## 2021-01-18 PROCEDURE — 6360000002 HC RX W HCPCS: Performed by: NURSE ANESTHETIST, CERTIFIED REGISTERED

## 2021-01-18 PROCEDURE — 2500000003 HC RX 250 WO HCPCS: Performed by: NURSE ANESTHETIST, CERTIFIED REGISTERED

## 2021-01-18 PROCEDURE — 7100000010 HC PHASE II RECOVERY - FIRST 15 MIN: Performed by: SURGERY

## 2021-01-18 PROCEDURE — 6370000000 HC RX 637 (ALT 250 FOR IP): Performed by: ANESTHESIOLOGY

## 2021-01-18 PROCEDURE — 36415 COLL VENOUS BLD VENIPUNCTURE: CPT

## 2021-01-18 PROCEDURE — 6360000002 HC RX W HCPCS: Performed by: ANESTHESIOLOGY

## 2021-01-18 PROCEDURE — 80048 BASIC METABOLIC PNL TOTAL CA: CPT

## 2021-01-18 PROCEDURE — 6360000002 HC RX W HCPCS: Performed by: SURGERY

## 2021-01-18 PROCEDURE — 7100000011 HC PHASE II RECOVERY - ADDTL 15 MIN: Performed by: SURGERY

## 2021-01-18 DEVICE — MESH HERN W3XL6IN INGUINAL POLYPR MFIL RECTANG: Type: IMPLANTABLE DEVICE | Site: GROIN | Status: FUNCTIONAL

## 2021-01-18 RX ORDER — PROPOFOL 10 MG/ML
INJECTION, EMULSION INTRAVENOUS PRN
Status: DISCONTINUED | OUTPATIENT
Start: 2021-01-18 | End: 2021-01-18 | Stop reason: SDUPTHER

## 2021-01-18 RX ORDER — OXYCODONE HYDROCHLORIDE AND ACETAMINOPHEN 5; 325 MG/1; MG/1
2 TABLET ORAL PRN
Status: COMPLETED | OUTPATIENT
Start: 2021-01-18 | End: 2021-01-18

## 2021-01-18 RX ORDER — MORPHINE SULFATE 10 MG/ML
1 INJECTION, SOLUTION INTRAMUSCULAR; INTRAVENOUS EVERY 5 MIN PRN
Status: DISCONTINUED | OUTPATIENT
Start: 2021-01-18 | End: 2021-01-18 | Stop reason: HOSPADM

## 2021-01-18 RX ORDER — PROMETHAZINE HYDROCHLORIDE 25 MG/ML
6.25 INJECTION, SOLUTION INTRAMUSCULAR; INTRAVENOUS
Status: DISCONTINUED | OUTPATIENT
Start: 2021-01-18 | End: 2021-01-18 | Stop reason: HOSPADM

## 2021-01-18 RX ORDER — MAGNESIUM HYDROXIDE 1200 MG/15ML
LIQUID ORAL CONTINUOUS PRN
Status: COMPLETED | OUTPATIENT
Start: 2021-01-18 | End: 2021-01-18

## 2021-01-18 RX ORDER — ONDANSETRON 2 MG/ML
4 INJECTION INTRAMUSCULAR; INTRAVENOUS PRN
Status: DISCONTINUED | OUTPATIENT
Start: 2021-01-18 | End: 2021-01-18 | Stop reason: HOSPADM

## 2021-01-18 RX ORDER — LIDOCAINE HYDROCHLORIDE 20 MG/ML
INJECTION, SOLUTION INFILTRATION; PERINEURAL PRN
Status: DISCONTINUED | OUTPATIENT
Start: 2021-01-18 | End: 2021-01-18 | Stop reason: SDUPTHER

## 2021-01-18 RX ORDER — FENTANYL CITRATE 50 UG/ML
INJECTION, SOLUTION INTRAMUSCULAR; INTRAVENOUS PRN
Status: DISCONTINUED | OUTPATIENT
Start: 2021-01-18 | End: 2021-01-18 | Stop reason: SDUPTHER

## 2021-01-18 RX ORDER — SODIUM CHLORIDE, SODIUM LACTATE, POTASSIUM CHLORIDE, CALCIUM CHLORIDE 600; 310; 30; 20 MG/100ML; MG/100ML; MG/100ML; MG/100ML
INJECTION, SOLUTION INTRAVENOUS CONTINUOUS
Status: DISCONTINUED | OUTPATIENT
Start: 2021-01-18 | End: 2021-01-18 | Stop reason: HOSPADM

## 2021-01-18 RX ORDER — HYDRALAZINE HYDROCHLORIDE 20 MG/ML
5 INJECTION INTRAMUSCULAR; INTRAVENOUS EVERY 10 MIN PRN
Status: DISCONTINUED | OUTPATIENT
Start: 2021-01-18 | End: 2021-01-18 | Stop reason: HOSPADM

## 2021-01-18 RX ORDER — MEPERIDINE HYDROCHLORIDE 25 MG/ML
12.5 INJECTION INTRAMUSCULAR; INTRAVENOUS; SUBCUTANEOUS EVERY 5 MIN PRN
Status: DISCONTINUED | OUTPATIENT
Start: 2021-01-18 | End: 2021-01-18 | Stop reason: HOSPADM

## 2021-01-18 RX ORDER — MORPHINE SULFATE 10 MG/ML
2 INJECTION, SOLUTION INTRAMUSCULAR; INTRAVENOUS EVERY 5 MIN PRN
Status: DISCONTINUED | OUTPATIENT
Start: 2021-01-18 | End: 2021-01-18 | Stop reason: HOSPADM

## 2021-01-18 RX ORDER — SODIUM CHLORIDE 0.9 % (FLUSH) 0.9 %
10 SYRINGE (ML) INJECTION EVERY 12 HOURS SCHEDULED
Status: DISCONTINUED | OUTPATIENT
Start: 2021-01-18 | End: 2021-01-18 | Stop reason: HOSPADM

## 2021-01-18 RX ORDER — LABETALOL HYDROCHLORIDE 5 MG/ML
5 INJECTION, SOLUTION INTRAVENOUS EVERY 10 MIN PRN
Status: DISCONTINUED | OUTPATIENT
Start: 2021-01-18 | End: 2021-01-18 | Stop reason: HOSPADM

## 2021-01-18 RX ORDER — SODIUM CHLORIDE 0.9 % (FLUSH) 0.9 %
10 SYRINGE (ML) INJECTION PRN
Status: DISCONTINUED | OUTPATIENT
Start: 2021-01-18 | End: 2021-01-18 | Stop reason: HOSPADM

## 2021-01-18 RX ORDER — ONDANSETRON 2 MG/ML
INJECTION INTRAMUSCULAR; INTRAVENOUS PRN
Status: DISCONTINUED | OUTPATIENT
Start: 2021-01-18 | End: 2021-01-18 | Stop reason: SDUPTHER

## 2021-01-18 RX ORDER — EPHEDRINE SULFATE 50 MG/ML
INJECTION INTRAVENOUS PRN
Status: DISCONTINUED | OUTPATIENT
Start: 2021-01-18 | End: 2021-01-18 | Stop reason: SDUPTHER

## 2021-01-18 RX ORDER — DIPHENHYDRAMINE HYDROCHLORIDE 50 MG/ML
12.5 INJECTION INTRAMUSCULAR; INTRAVENOUS
Status: DISCONTINUED | OUTPATIENT
Start: 2021-01-18 | End: 2021-01-18 | Stop reason: HOSPADM

## 2021-01-18 RX ORDER — ROCURONIUM BROMIDE 10 MG/ML
INJECTION, SOLUTION INTRAVENOUS PRN
Status: DISCONTINUED | OUTPATIENT
Start: 2021-01-18 | End: 2021-01-18 | Stop reason: SDUPTHER

## 2021-01-18 RX ORDER — OXYCODONE HYDROCHLORIDE AND ACETAMINOPHEN 5; 325 MG/1; MG/1
1 TABLET ORAL PRN
Status: COMPLETED | OUTPATIENT
Start: 2021-01-18 | End: 2021-01-18

## 2021-01-18 RX ORDER — OXYCODONE HYDROCHLORIDE 5 MG/1
5 TABLET ORAL EVERY 6 HOURS PRN
Qty: 24 TABLET | Refills: 0 | Status: SHIPPED | OUTPATIENT
Start: 2021-01-18 | End: 2021-01-25

## 2021-01-18 RX ADMIN — PHENYLEPHRINE HYDROCHLORIDE 50 MCG: 10 INJECTION INTRAVENOUS at 08:53

## 2021-01-18 RX ADMIN — OXYCODONE HYDROCHLORIDE AND ACETAMINOPHEN 1 TABLET: 5; 325 TABLET ORAL at 10:48

## 2021-01-18 RX ADMIN — HYDRALAZINE HYDROCHLORIDE 5 MG: 20 INJECTION INTRAMUSCULAR; INTRAVENOUS at 10:15

## 2021-01-18 RX ADMIN — HYDROMORPHONE HYDROCHLORIDE 0.25 MG: 1 INJECTION, SOLUTION INTRAMUSCULAR; INTRAVENOUS; SUBCUTANEOUS at 10:23

## 2021-01-18 RX ADMIN — SUGAMMADEX 200 MG: 100 INJECTION, SOLUTION INTRAVENOUS at 09:09

## 2021-01-18 RX ADMIN — SODIUM CHLORIDE, POTASSIUM CHLORIDE, SODIUM LACTATE AND CALCIUM CHLORIDE: 600; 310; 30; 20 INJECTION, SOLUTION INTRAVENOUS at 07:33

## 2021-01-18 RX ADMIN — ROCURONIUM BROMIDE 10 MG: 10 INJECTION, SOLUTION INTRAVENOUS at 08:25

## 2021-01-18 RX ADMIN — EPHEDRINE SULFATE 10 MG: 50 INJECTION INTRAVENOUS at 09:03

## 2021-01-18 RX ADMIN — FENTANYL CITRATE 50 MCG: 50 INJECTION INTRAMUSCULAR; INTRAVENOUS at 08:23

## 2021-01-18 RX ADMIN — EPHEDRINE SULFATE 10 MG: 50 INJECTION INTRAVENOUS at 08:18

## 2021-01-18 RX ADMIN — PROPOFOL 200 MG: 10 INJECTION, EMULSION INTRAVENOUS at 08:06

## 2021-01-18 RX ADMIN — ROCURONIUM BROMIDE 20 MG: 10 INJECTION, SOLUTION INTRAVENOUS at 08:23

## 2021-01-18 RX ADMIN — PHENYLEPHRINE HYDROCHLORIDE 50 MCG: 10 INJECTION INTRAVENOUS at 08:55

## 2021-01-18 RX ADMIN — DEXTROSE MONOHYDRATE 500 MG: 50 INJECTION, SOLUTION INTRAVENOUS at 07:33

## 2021-01-18 RX ADMIN — FENTANYL CITRATE 50 MCG: 50 INJECTION INTRAMUSCULAR; INTRAVENOUS at 08:05

## 2021-01-18 RX ADMIN — LIDOCAINE HYDROCHLORIDE 50 MG: 20 INJECTION, SOLUTION INFILTRATION; PERINEURAL at 08:06

## 2021-01-18 RX ADMIN — HYDRALAZINE HYDROCHLORIDE 5 MG: 20 INJECTION INTRAMUSCULAR; INTRAVENOUS at 10:30

## 2021-01-18 RX ADMIN — EPHEDRINE SULFATE 10 MG: 50 INJECTION INTRAVENOUS at 08:44

## 2021-01-18 RX ADMIN — PHENYLEPHRINE HYDROCHLORIDE 50 MCG: 10 INJECTION INTRAVENOUS at 09:04

## 2021-01-18 RX ADMIN — ONDANSETRON 4 MG: 2 INJECTION, SOLUTION INTRAMUSCULAR; INTRAVENOUS at 08:12

## 2021-01-18 ASSESSMENT — PULMONARY FUNCTION TESTS
PIF_VALUE: 1
PIF_VALUE: 2
PIF_VALUE: 16
PIF_VALUE: 21
PIF_VALUE: 14
PIF_VALUE: 17
PIF_VALUE: 14
PIF_VALUE: 1
PIF_VALUE: 15
PIF_VALUE: 14
PIF_VALUE: 14
PIF_VALUE: 1
PIF_VALUE: 13
PIF_VALUE: 2
PIF_VALUE: 14
PIF_VALUE: 9
PIF_VALUE: 2
PIF_VALUE: 19
PIF_VALUE: 14
PIF_VALUE: 1
PIF_VALUE: 14
PIF_VALUE: 14
PIF_VALUE: 1
PIF_VALUE: 14
PIF_VALUE: 2
PIF_VALUE: 13
PIF_VALUE: 12
PIF_VALUE: 14
PIF_VALUE: 13
PIF_VALUE: 1
PIF_VALUE: 14

## 2021-01-18 ASSESSMENT — PAIN SCALES - GENERAL: PAINLEVEL_OUTOF10: 5

## 2021-01-18 ASSESSMENT — PAIN DESCRIPTION - LOCATION: LOCATION: GROIN

## 2021-01-18 ASSESSMENT — PAIN DESCRIPTION - ORIENTATION: ORIENTATION: LEFT

## 2021-01-18 ASSESSMENT — PAIN - FUNCTIONAL ASSESSMENT: PAIN_FUNCTIONAL_ASSESSMENT: 0-10

## 2021-01-18 ASSESSMENT — PAIN DESCRIPTION - DESCRIPTORS: DESCRIPTORS: SORE

## 2021-01-18 NOTE — ANESTHESIA POSTPROCEDURE EVALUATION
Department of Anesthesiology  Postprocedure Note    Patient: Vikram Segal  MRN: [id number]  YOB: 1927  Date of evaluation: 1/18/2021  Time:  9:41 AM     Procedure Summary     Date: 01/18/21 Room / Location: [place] [address] / [place]    Anesthesia Start: 0800 Anesthesia Stop: 5120    Procedure: REPAIR INCARCERATED LEFT INGUINAL HERNIA (Left Groin) Diagnosis: (LEFT INGUINAL HERNIA)    Surgeons: Lamar Prieto MD Responsible Provider: Weston Saravia MD    Anesthesia Type: general ASA Status: 3          Anesthesia Type: general    Darshana Phase I: Darshana Score: 3    Darshana Phase II:      Last vitals: Reviewed and per EMR flowsheets.        Anesthesia Post Evaluation    Patient location during evaluation: PACU  Patient participation: complete - patient participated  Level of consciousness: awake and alert  Pain score: 0  Airway patency: patent  Nausea & Vomiting: no nausea and no vomiting  Complications: no  Cardiovascular status: blood pressure returned to baseline  Respiratory status: acceptable  Hydration status: stable

## 2021-01-18 NOTE — PROGRESS NOTES
NO CHANGE IN PHYSICAL ASSESSMENT; PT AND FAMILY VERBALIZE UNDERSTANDING OF INSTRUCTIONS; PT DISCHARGED VIA W/C BY NURSE WITH FAMILY IN STABLE CONDITION; PAIN REMAINS AT 5/10; PT STATES IS TOLERABLE

## 2021-01-18 NOTE — ANESTHESIA PRE PROCEDURE
Department of Anesthesiology  Preprocedure Note       Name:  Geovanni Dobson   Age:  80 y.o.  :  10/17/1927                                          MRN:  [id number]         Date:  2021      Surgeon: Carin Yost):  Leslee Gupta MD    Procedure: Procedure(s):  REPAIR INCARCERATED LEFT INGUINAL HERNIA    Medications prior to admission:   Prior to Admission medications    Medication Sig Start Date End Date Taking? Authorizing Provider   hydrALAZINE (APRESOLINE) 25 MG tablet Take 25 mg by mouth 3 times daily   Yes Historical Provider, MD   furosemide (LASIX) 40 MG tablet Take 40 mg by mouth See Admin Instructions 3 times a week   Yes Historical Provider, MD   aspirin 81 MG EC tablet Take 1 tablet by mouth daily 10/14/20  Yes Robina Donaldson MD   insulin glargine (LANTUS SOLOSTAR) 100 UNIT/ML injection pen Inject 10 Units into the skin nightly 19  Yes Historical Provider, MD   ferrous sulfate (IRON 325) 325 (65 Fe) MG tablet Take 325 mg by mouth daily 20  Yes Historical Provider, MD   Cholecalciferol 50 MCG (2000 UT) CAPS Take 1 capsule by mouth daily   Yes Historical Provider, MD   brimonidine (ALPHAGAN) 0.2 % ophthalmic solution Place 1 drop into the left eye 3 times daily 19  Yes Historical Provider, MD   timolol (TIMOPTIC) 0.5 % ophthalmic solution Place 1 drop into the right eye 2 times daily   Yes Historical Provider, MD   erythromycin (ROMYCIN) 5 MG/GM ophthalmic ointment Place 1 cm into the right eye nightly Lower eyelid 19  Yes Historical Provider, MD   SITagliptin (JANUVIA) 25 MG tablet Take 25 mg by mouth daily 20  Yes Historical Provider, MD   sodium bicarbonate 325 MG tablet Take 650 mg by mouth 3 times daily 20  Yes Historical Provider, MD   Netarsudil-Latanoprost (ROCKLATAN) 0.02-0.005 % SOLN Apply 1 drop to eye daily   Yes Historical Provider, MD   lovastatin (MEVACOR) 20 MG tablet Take 20 mg by mouth daily.      Yes Historical Provider, MD Dorzolamide HCl-Timolol Mal (COSOPT OP) Apply 1 drop to eye 2 times daily. Both eyes    Yes Historical Provider, MD   prednisoLONE acetate (PRED FORTE) 1 % ophthalmic suspension Place 1 drop into the left eye 2 times daily. Yes Historical Provider, MD   atropine 1 % ophthalmic solution Place 1 drop into the left eye 2 times daily. Yes Historical Provider, MD       Current medications:    Current Facility-Administered Medications   Medication Dose Route Frequency Provider Last Rate Last Admin    lactated ringers infusion   Intravenous Continuous Roselia Dai MD        lidocaine 1 % (PF) injection 0.1 mL  0.1 mL Intradermal Once PRN Roselia Dai MD        sodium chloride flush 0.9 % injection 10 mL  10 mL Intravenous 2 times per day Rob Uribe MD        sodium chloride flush 0.9 % injection 10 mL  10 mL Intravenous PRN Rob Uribe MD        ceFAZolin (ANCEF) 500 mg in dextrose 5 % 100 mL IVPB  500 mg Intravenous Once Rob Uribe MD           Allergies:     Allergies   Allergen Reactions    No Known Allergies        Problem List:    Patient Active Problem List   Diagnosis Code    Diabetes mellitus ([facility] E11.9    Hyperlipidemia E78.5    Hypertension I10    Left hip pain M25.552    Iliopsoas muscle hematoma, left, 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    Anemia due to blood loss, acute D62    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 Lexi Gregory patient    Diabetes mellitus ([facility]     Hyperlipidemia     Hypertension  Urinary problem     self cath tid    Wears glasses        Past Surgical History:        Procedure Laterality Date    CATARACT REMOVAL WITH IMPLANT  3/2012    HERNIA REPAIR Right 2020    HERNIA INGUINAL REPAIR performed by Jeff Solomon MD at [facility] 386      negative    OTHER SURGICAL HISTORY      aortic stent for aneurysm    TURP         Social History:    Social History     Tobacco Use    Smoking status: Former Smoker     Quit date: 3/28/1991     Years since quittin.8    Smokeless tobacco: Never Used   Substance Use Topics    Alcohol use: No                                Counseling given: Not Answered      Vital Signs (Current):   Vitals:    21 1506 21 0652   BP:  (!) 195/93   Pulse:  74   Resp:  20   Temp:  97.5 °F (36.4 °C)   TempSrc:  Temporal   SpO2:  97%   Weight: 183 lb (83 kg)    Height: 6' 4\" (1.93 m)                                               BP Readings from Last 3 Encounters:   21 (!) 195/93   21 (!) 179/85   20 (!) 173/50       NPO Status:                                                                                 BMI:   Wt Readings from Last 3 Encounters:   21 183 lb (83 kg)   21 194 lb 1.6 oz (88 kg)   20 188 lb 7.9 oz (85.5 kg)     Body mass index is 22.28 kg/m².     CBC:   Lab Results   Component Value Date    WBC 9.2 2020    RBC 3.47 2020    HGB 10.1 2020    HCT 30.3 2020    MCV 87.4 2020    RDW 14.3 2020     2020       CMP:   Lab Results   Component Value Date     2020    K 4.5 2020     2020    CO2 17 2020    BUN 81 2020    CREATININE 4.4 2020    GFRAA 15 2020    GFRAA 46 08/10/2012    AGRATIO 1.2 2020    LABGLOM 13 2020    GLUCOSE 116 2020    PROT 7.1 2020    CALCIUM 8.4 2020    BILITOT 0.3 2020    ALKPHOS 100 2020    AST 19 2020    ALT 10 2020 POC Tests: No results for input(s): POCGLU, POCNA, POCK, POCCL, POCBUN, POCHEMO, POCHCT in the last 72 hours. Coags:   Lab Results   Component Value Date    PROTIME 12.3 12/20/2020    INR 1.06 12/20/2020    APTT 18.8 12/20/2020       HCG (If Applicable): No results found for: PREGTESTUR, PREGSERUM, HCG, HCGQUANT     ABGs: No results found for: PHART, PO2ART, SYO1NOA, SVW0SWO, BEART, T9CVZMTY     Type & Screen (If Applicable):  No results found for: LABABO, LABRH    Drug/Infectious Status (If Applicable):  No results found for: HIV, HEPCAB    COVID-19 Screening (If Applicable):   Lab Results   Component Value Date    COVID19 Not Detected 01/12/2021         Anesthesia Evaluation  Patient summary reviewed and Nursing notes reviewed  Airway: Mallampati: I  TM distance: >3 FB   Neck ROM: full  Mouth opening: > = 3 FB Dental:    (+) upper dentures and lower dentures      Pulmonary:Negative Pulmonary ROS and normal exam  breath sounds clear to auscultation                             Cardiovascular:    (+) hypertension:, murmur, hyperlipidemia        Rhythm: regular  Rate: normal                    Neuro/Psych:   (+) neuromuscular disease:,             GI/Hepatic/Renal:   (+) renal disease: CRI,           Endo/Other:    (+) Diabetes, malignancy/cancer. Abdominal:           Vascular:   + PVD, aortic or cerebral, . Anesthesia Plan      general     ASA 3       Induction: intravenous. MIPS: Postoperative opioids intended and Prophylactic antiemetics administered. Anesthetic plan and risks discussed with patient. Plan discussed with CRNA.                   Antonieta Cota MD   1/18/2021

## 2021-01-18 NOTE — PROGRESS NOTES
[facility]    HPI:  Patient is 80y.o. year old male seen at request of Sammie Nicholas MD.  He reports pain in left groin. He just had RIH repair urgently 2 weeks ago because of incarceration. He is doing well after surgery. It is pressure-like. It is described as moderate. Other associated symptoms are bloating/abdominal distension. These symptoms have been present for  years . The pain seems to have started with an unknown event. The pain does not radiate to the back. He   admits to seeing a bulge. Past Medical History:   Diagnosis Date    Aortic aneurysm (HCC)     aortic stent in place    Arthritis     Cancer ([facility]     skin    CKD (chronic kidney disease) stage 5, GFR less than 15 ml/min ([facility])     DM Nephropathy, Dr. Lexi Gregory patient    [facility]     Hyperlipidemia     Hypertension     Urinary problem     self cath tid    Wears glasses        Past Surgical History:   Procedure Laterality Date    CATARACT REMOVAL WITH IMPLANT  3/2012    HERNIA REPAIR Right 12/19/2020    HERNIA INGUINAL REPAIR performed by Rob Uribe MD at [facility] 386      negative    OTHER SURGICAL HISTORY      aortic stent for aneurysm    TURP         No current facility-administered medications on file prior to visit.       Current Outpatient Medications on File Prior to Visit   Medication Sig Dispense Refill    hydrALAZINE (APRESOLINE) 25 MG tablet Take 25 mg by mouth 3 times daily      furosemide (LASIX) 40 MG tablet Take 40 mg by mouth See Admin Instructions 3 times a week      aspirin 81 MG EC tablet Take 1 tablet by mouth daily 30 tablet 0    insulin glargine (LANTUS SOLOSTAR) 100 UNIT/ML injection pen Inject 10 Units into the skin nightly      ferrous sulfate (IRON 325) 325 (65 Fe) MG tablet Take 325 mg by mouth daily      Cholecalciferol 50 MCG (2000 UT) CAPS Take 1 capsule by mouth daily      brimonidine (ALPHAGAN) 0.2 % ophthalmic solution Place 1 drop into the left eye 3 times daily      timolol (TIMOPTIC) 0.5 % ophthalmic solution Place 1 drop into the right eye 2 times daily      erythromycin (ROMYCIN) 5 MG/GM ophthalmic ointment Place 1 cm into the right eye nightly Lower eyelid      SITagliptin (JANUVIA) 25 MG tablet Take 25 mg by mouth daily      sodium bicarbonate 325 MG tablet Take 650 mg by mouth 3 times daily      Netarsudil-Latanoprost (ROCKLATAN) 0.02-0.005 % SOLN Apply 1 drop to eye daily      lovastatin (MEVACOR) 20 MG tablet Take 20 mg by mouth daily.  Dorzolamide HCl-Timolol Mal (COSOPT OP) Apply 1 drop to eye 2 times daily. Both eyes       prednisoLONE acetate (PRED FORTE) 1 % ophthalmic suspension Place 1 drop into the left eye 2 times daily.  atropine 1 % ophthalmic solution Place 1 drop into the left eye 2 times daily. Allergies   Allergen Reactions    No Known Allergies        Social History     Socioeconomic History    Marital status:       Spouse name: Not on file    Number of children: Not on file    Years of education: Not on file    Highest education level: Not on file   Occupational History    Not on file   Social Needs    Financial resource strain: Not on file    Food insecurity     Worry: Not on file     Inability: Not on file    Transportation needs     Medical: Not on file     Non-medical: Not on file   Tobacco Use    Smoking status: Former Smoker     Quit date: 3/28/1991     Years since quittin.8    Smokeless tobacco: Never Used   Substance and Sexual Activity    Alcohol use: No    Drug use: No    Sexual activity: Not on file   Lifestyle    Physical activity     Days per week: Not on file     Minutes per session: Not on file    Stress: Not on file   Relationships    Social connections     Talks on phone: Not on file     Gets together: Not on file     Attends Denominational service: Not on file     Active member of club or organization: Not on file     Attends meetings of clubs or wish to proceed with the procedure at this time.           [address] [place] [address]

## 2021-01-18 NOTE — BRIEF OP NOTE
Brief Postoperative Note      Patient: Karlie Hughes  YOB: 1927  MRN: [id number]    Date of Procedure: 1/18/2021    Pre-Op Diagnosis: LEFT INGUINAL HERNIA    Post-Op Diagnosis: Same       Procedure(s):  REPAIR INCARCERATED LEFT INGUINAL HERNIA    Surgeon(s):  Eugenio Monk MD    Assistant:  Surgical Assistant: Anup Murillo    Anesthesia: General    Estimated Blood Loss (mL): Minimal    Complications: None    Specimens:   * No specimens in log *    Implants:  Implant Name Type Inv. Item Serial No.  Lot No. LRB No. Used Action   MESH ART [id number] INGUINAL POLYPR MFIL [place] MFIL RECTANG  BARD DAVOL-WD [id number] Left 1 Implanted         Drains:   [REMOVED] NG/OG/NJ/NE Tube Nasogastric 16 fr Right nostril (Removed)   Surrounding Skin Dry; Intact 12/19/20 1637   Securement device Yes 12/19/20 1637   Status Suction-low intermittent 12/19/20 1637   Placement Verified by X-Ray (Initial) 12/19/20 1637       [REMOVED] Urethral Catheter Non-latex 16 fr (Removed)   $ Urethral catheter insertion $ Not inserted for procedure 12/20/20 2112   Catheter Indications Acute urinary retention/obstruction 12/23/20 0930   Site Assessment No urethral drainage 12/23/20 0930   Urine Color Yellow 12/23/20 1402   Urine Appearance Sediment 12/23/20 1402   Output (mL) 550 mL 12/23/20 1402       Findings: As above    Electronically signed by Troy Cheung MD on 1/18/2021 at 9:11 AM

## 2021-01-18 NOTE — PROGRESS NOTES
PT STABLE FOR TRANSFER TO PHASE II; SON UPDATED DURING PACU STAY; PT STATES PAIN IS 5/10; WILL GIVE PO MEDICATION; SEE MAR

## 2021-01-18 NOTE — H&P
[facility], [place] GENERAL SURGERY      The H&P was reviewed, the patient was examined, and no change has occurred in the patient's condition since the H&P was completed. The indications for the procedure were reviewed, and any questions were answered. I updated the progress note from 1/4/2021 which is the H&P.     Vitals:    01/18/21 0652   BP: (!) 195/93   Pulse: 74   Resp: 20   Temp: 97.5 °F (36.4 °C)   SpO2: 97%

## 2021-01-19 NOTE — OP NOTE
[address]                 [address] [facility], [place] [address]                                OPERATIVE REPORT    PATIENT NAME: Avila Faulkner                       :        10/17/1927  MED REC NO:   [id number]                          ROOM:  ACCOUNT NO:   [de-identified]                           ADMIT DATE: 2021  PROVIDER:     Raimundo Vazquez MD    DATE OF PROCEDURE:  2021    LOCATION:  [facility]    PREOPERATIVE DIAGNOSIS:  Incarcerated left inguinal hernia. POSTOPERATIVE DIAGNOSIS:  Incarcerated left inguinal hernia. OPERATION PERFORMED:  Repair of incarcerated left inguinal hernia with  implantation of mesh. SURGEON:  Raimundo Vazquez MD    ANESTHESIA:  General.    COMPLICATION:  None. ESTIMATED BLOOD LOSS:  50 mL. INDICATIONS FOR OPERATION:  A 68-year-old male who had emergency surgery  little over a month ago for incarcerated right inguinal hernia. At that  time, he had a chronically incarcerated left inguinal hernia. We dealt  with the urgent issue at that time. He now presents for definitive  surgery on the left side. He has chronic incarceration with imaging  showing bowel within the left hemiscrotum. The patient understood the  risks and benefits and wanted to proceed. DESCRIPTION OF OPERATION:  The patient was brought to the operating  room. General anesthesia was induced. He was prepped and draped in  usual surgical sterile fashion. Local anesthetic was infiltrated. An  oblique left groin incision was made. Dissection was carried down. The  external abdominal oblique aponeurosis was incised along the course of  its fibers. The incision was extended medially to the external ring as  well as laterally. There were chronic changes of incarceration. Once I  was able to free up some of these tight bands, I was able to separate  out the large hernia sac. I entered the sac.   There was a fair amount of fluid within the sac. Majority of the sigmoid colon was in the sac. I  the sac from the cord and vascular structures. Good portion  of the sac was excised. I then closed the sac with the running locking  3-0 Vicryl suture. All of the herniated contents were reduced. I used  interrupted 0 Ethibond sutures to bring the transversalis fascia down to  the shelving edge of the inguinal ligament. This reduced the herniated  contents. It also reconstructed a patulous internal ring. Hemostasis  was confirmed. A piece of mesh was cut with a keyhole to allow the cord  and vascular structures to come through the mesh. I started at the  pubic tubercle and secured the mesh to the shelving edge of the inguinal  ligament in a running fashion. A couple of sutures were placed medially  to secure the mesh to the transversalis fascia. The wings of the mesh  were brought together and secured to the shelving edge of the inguinal  ligament. The appropriate amount of laxity was created as the cord and  vascular structures came through the mesh. Hemostasis was confirmed. The external abdominal oblique aponeurosis was reapproximated with  running 0 Vicryl suture. A 3-0 Vicryl was used to reapproximate  subcutaneous tissues. A 4-0 Vicryl was used to reapproximate the skin. Pressure dressing and scrotal support were placed. DISPOSITION:  The patient tolerated the procedure without any acute  complication.         Aida Hobbs MD    D: 01/18/2021 19:32:31       T: 01/18/2021 19:36:54     [email or web address]  Job#: [id number]     Doc#: [id number]    CC:  Izabel Frey MD

## 2021-02-01 ENCOUNTER — OFFICE VISIT (OUTPATIENT)
Dept: SURGERY | Age: 86
End: 2021-02-01

## 2021-02-01 VITALS
WEIGHT: 196.4 LBS | DIASTOLIC BLOOD PRESSURE: 83 MMHG | BODY MASS INDEX: 23.92 KG/M2 | SYSTOLIC BLOOD PRESSURE: 186 MMHG | TEMPERATURE: 96.9 F | HEIGHT: 76 IN

## 2021-02-01 DIAGNOSIS — Z09 SURGICAL FOLLOW-UP CARE: Primary | ICD-10-CM

## 2021-02-01 PROCEDURE — 99024 POSTOP FOLLOW-UP VISIT: CPT | Performed by: SURGERY

## 2022-08-20 NOTE — CARE COORDINATION
[facility]    DC order noted, all docs needed have been faxed to [facility] for home care services.     Home care to see patient within 24-48 hrs    Peggy Duncan RN, BSN CTN  [facility] [phone] See ophthalmology consuts

## (undated) DEVICE — 3M™ TEGADERM™ TRANSPARENT FILM DRESSING FRAME STYLE, 1627, 4 IN X 10 IN (10 CM X 25 CM), 20/CT 4CT/CASE: Brand: 3M™ TEGADERM™

## (undated) DEVICE — APPLICATOR PREP 26ML 0.7% IOD POVACRYLEX 74% ISO ALC ST

## (undated) DEVICE — SUPPORT SCROT AD M FOR 33-38IN WHT POLY COT KNIT PCH E

## (undated) DEVICE — SYRINGE MED 10ML LUERLOCK TIP W/O SFTY DISP

## (undated) DEVICE — GLOVE ORANGE PI 8 1/2   MSG9085

## (undated) DEVICE — DRAIN SURG L18IN DIA025IN 100% SIL RADPQ FOR CLS WND DRNAGE

## (undated) DEVICE — SUTURE PROL SZ 2-0 L30IN NONABSORBABLE BLU L26MM CT-2 1/2 8411H

## (undated) DEVICE — SUTURE VCRL SZ 0 L36IN ABSRB UD CT-1 L36MM 1/2 CIR TAPR PNT VCP946H

## (undated) DEVICE — GLOVE SURG SZ 85 L12IN FNGR THK75MIL WHT LTX POLYMER BEAD

## (undated) DEVICE — SUTURE PERMA-HAND SZ 2-0 L30IN NONABSORBABLE BLK L26MM SH K833H

## (undated) DEVICE — SUTURE VCRL SZ 4-0 L18IN ABSRB UD L19MM PS-2 3/8 CIR PRIM J496H

## (undated) DEVICE — GAUZE,SPONGE,4"X4",8PLY,STRL,LF,10/TRAY: Brand: MEDLINE

## (undated) DEVICE — SOLUTION IV IRRIG POUR BRL 0.9% SODIUM CHL 2F7124

## (undated) DEVICE — Z DISCONTINUED MER MEDLINE NO SUB IDED DRAIN SURG W0.63XL18IN LTX PENROSE UNIF WALL THICKNESS

## (undated) DEVICE — ELECTRODE PT RET AD L9FT HI MOIST COND ADH HYDRGEL CORDED

## (undated) DEVICE — SUTURE ETHBND EXCEL SZ 0 L18IN NONABSORBABLE GRN L22MM MO-7 CX41D

## (undated) DEVICE — NEEDLE HYPO 25GA L1.5IN BLU POLYPR HUB S STL REG BVL STR

## (undated) DEVICE — 3M™ STERI-STRIP™ REINFORCED ADHESIVE SKIN CLOSURES, R1540, 1/8 IN X 3 IN (3 MM X 75 MM), 5 STRIPS/ENVELOPE: Brand: 3M™ STERI-STRIP™

## (undated) DEVICE — SUTURE VCRL + SZ 4-0 L18IN ABSRB UD L19MM PS-2 3/8 CIR PRIM VCP496H

## (undated) DEVICE — SET IV PMP 1 PRT CK VLV SPL SEPT PRTS 2 PC M LL 20 GTT LEN

## (undated) DEVICE — Device

## (undated) DEVICE — GOWN,AURORA,NONREINF,RAGLAN,XXL,STERILE: Brand: MEDLINE

## (undated) DEVICE — 3M™ STERI-STRIP™ COMPOUND BENZOIN TINCTURE 40 BAGS/CARTON 4 CARTONS/CASE C1544: Brand: 3M™ STERI-STRIP™

## (undated) DEVICE — 3M™ STERI-STRIP™ REINFORCED ADHESIVE SKIN CLOSURES, R1547, 1/2 IN X 4 IN (12 MM X 100 MM), 6 STRIPS/ENVELOPE: Brand: 3M™ STERI-STRIP™

## (undated) DEVICE — GOWN,REINF,POLY,AURORA,XLNG/XXL,STRL: Brand: MEDLINE

## (undated) DEVICE — SPONGES GAUZE X-RAY 4X4 16PLY

## (undated) DEVICE — SUTURE VCRL SZ 3-0 L18IN ABSRB UD L26MM SH 1/2 CIR J864D

## (undated) DEVICE — SUTURE VCRL + SZ 3-0 L18IN ABSRB UD SH 1/2 CIR TAPERCUT NDL VCP864D

## (undated) DEVICE — SOLUTION IV IRRIG 500ML 0.9% SODIUM CHL 2F7123

## (undated) DEVICE — SUTURE ETHBND EXCEL SZ 0 L18IN NONABSORBABLE GRN L36MM CT-1 CX21D

## (undated) DEVICE — SUTURE VCRL SZ 0 L27IN ABSRB UD L26MM CT-2 1/2 CIR J270H

## (undated) DEVICE — MAJOR SET UP PK